# Patient Record
Sex: FEMALE | Race: WHITE | NOT HISPANIC OR LATINO | Employment: FULL TIME | URBAN - NONMETROPOLITAN AREA
[De-identification: names, ages, dates, MRNs, and addresses within clinical notes are randomized per-mention and may not be internally consistent; named-entity substitution may affect disease eponyms.]

---

## 2018-02-28 ENCOUNTER — OFFICE VISIT (OUTPATIENT)
Dept: INTERNAL MEDICINE | Facility: OTHER | Age: 53
End: 2018-02-28
Attending: INTERNAL MEDICINE
Payer: COMMERCIAL

## 2018-02-28 ENCOUNTER — TELEPHONE (OUTPATIENT)
Dept: INTERNAL MEDICINE | Facility: OTHER | Age: 53
End: 2018-02-28

## 2018-02-28 ENCOUNTER — HOSPITAL ENCOUNTER (EMERGENCY)
Facility: OTHER | Age: 53
Discharge: HOME | End: 2018-02-28
Attending: EMERGENCY MEDICINE | Admitting: EMERGENCY MEDICINE
Payer: COMMERCIAL

## 2018-02-28 ENCOUNTER — APPOINTMENT (OUTPATIENT)
Dept: CT IMAGING | Facility: OTHER | Age: 53
End: 2018-02-28
Attending: EMERGENCY MEDICINE
Payer: COMMERCIAL

## 2018-02-28 VITALS
HEART RATE: 70 BPM | TEMPERATURE: 98.2 F | SYSTOLIC BLOOD PRESSURE: 133 MMHG | OXYGEN SATURATION: 95 % | HEIGHT: 64 IN | WEIGHT: 140 LBS | DIASTOLIC BLOOD PRESSURE: 95 MMHG | RESPIRATION RATE: 18 BRPM | BODY MASS INDEX: 23.9 KG/M2

## 2018-02-28 VITALS
HEART RATE: 72 BPM | HEIGHT: 64 IN | BODY MASS INDEX: 25.37 KG/M2 | SYSTOLIC BLOOD PRESSURE: 124 MMHG | DIASTOLIC BLOOD PRESSURE: 94 MMHG | WEIGHT: 148.6 LBS

## 2018-02-28 DIAGNOSIS — S30.1XXD RECTUS SHEATH HEMATOMA, SUBSEQUENT ENCOUNTER: Primary | ICD-10-CM

## 2018-02-28 LAB
ANION GAP SERPL CALCULATED.3IONS-SCNC: 12 MMOL/L (ref 3–14)
BUN SERPL-MCNC: 15 MG/DL (ref 7–25)
CALCIUM SERPL-MCNC: 9.8 MG/DL (ref 8.6–10.3)
CHLORIDE SERPL-SCNC: 105 MMOL/L (ref 98–107)
CO2 SERPL-SCNC: 24 MMOL/L (ref 21–31)
CREAT SERPL-MCNC: 0.7 MG/DL (ref 0.6–1.2)
DIFFERENTIAL METHOD BLD: NORMAL
ERYTHROCYTE [DISTWIDTH] IN BLOOD BY AUTOMATED COUNT: 14 % (ref 10–15)
GFR SERPL CREATININE-BSD FRML MDRD: 87 ML/MIN/1.7M2
GLUCOSE SERPL-MCNC: 115 MG/DL (ref 70–105)
HCT VFR BLD AUTO: 42.1 % (ref 35–47)
HGB BLD-MCNC: 14.3 G/DL (ref 11.7–15.7)
INR PPP: 0.92 (ref 0–1.3)
MCH RBC QN AUTO: 30.6 PG (ref 26.5–33)
MCHC RBC AUTO-ENTMCNC: 34 G/DL (ref 31.5–36.5)
MCV RBC AUTO: 90 FL (ref 78–100)
PLATELET # BLD AUTO: 196 10E9/L (ref 150–450)
POTASSIUM SERPL-SCNC: 3.8 MMOL/L (ref 3.5–5.1)
RBC # BLD AUTO: 4.68 10E12/L (ref 3.8–5.2)
SODIUM SERPL-SCNC: 141 MMOL/L (ref 134–144)
WBC # BLD AUTO: 7.9 10E9/L (ref 4–11)

## 2018-02-28 PROCEDURE — 96376 TX/PRO/DX INJ SAME DRUG ADON: CPT | Performed by: EMERGENCY MEDICINE

## 2018-02-28 PROCEDURE — 85610 PROTHROMBIN TIME: CPT | Performed by: EMERGENCY MEDICINE

## 2018-02-28 PROCEDURE — 80048 BASIC METABOLIC PNL TOTAL CA: CPT | Performed by: EMERGENCY MEDICINE

## 2018-02-28 PROCEDURE — 99214 OFFICE O/P EST MOD 30 MIN: CPT | Performed by: INTERNAL MEDICINE

## 2018-02-28 PROCEDURE — 36415 COLL VENOUS BLD VENIPUNCTURE: CPT | Performed by: EMERGENCY MEDICINE

## 2018-02-28 PROCEDURE — 99284 EMERGENCY DEPT VISIT MOD MDM: CPT | Mod: Z6 | Performed by: EMERGENCY MEDICINE

## 2018-02-28 PROCEDURE — 99285 EMERGENCY DEPT VISIT HI MDM: CPT | Mod: 25 | Performed by: EMERGENCY MEDICINE

## 2018-02-28 PROCEDURE — 74177 CT ABD & PELVIS W/CONTRAST: CPT | Mod: TC

## 2018-02-28 PROCEDURE — 25000128 H RX IP 250 OP 636: Performed by: EMERGENCY MEDICINE

## 2018-02-28 PROCEDURE — 85025 COMPLETE CBC W/AUTO DIFF WBC: CPT | Performed by: EMERGENCY MEDICINE

## 2018-02-28 PROCEDURE — 96374 THER/PROPH/DIAG INJ IV PUSH: CPT | Performed by: EMERGENCY MEDICINE

## 2018-02-28 PROCEDURE — 25000125 ZZHC RX 250: Performed by: EMERGENCY MEDICINE

## 2018-02-28 RX ORDER — MORPHINE SULFATE 4 MG/ML
2 INJECTION, SOLUTION INTRAMUSCULAR; INTRAVENOUS ONCE
Status: COMPLETED | OUTPATIENT
Start: 2018-02-28 | End: 2018-02-28

## 2018-02-28 RX ORDER — HYDROCODONE BITARTRATE AND ACETAMINOPHEN 5; 325 MG/1; MG/1
1 TABLET ORAL EVERY 6 HOURS PRN
Qty: 12 TABLET | Refills: 0 | Status: SHIPPED | OUTPATIENT
Start: 2018-02-28 | End: 2021-02-23

## 2018-02-28 RX ORDER — MORPHINE SULFATE 4 MG/ML
4 INJECTION, SOLUTION INTRAMUSCULAR; INTRAVENOUS ONCE
Status: COMPLETED | OUTPATIENT
Start: 2018-02-28 | End: 2018-02-28

## 2018-02-28 RX ADMIN — MORPHINE SULFATE 4 MG: 10 INJECTION, SOLUTION INTRAMUSCULAR; INTRAVENOUS at 01:05

## 2018-02-28 RX ADMIN — IOHEXOL 100 ML: 350 INJECTION, SOLUTION INTRAVENOUS at 01:50

## 2018-02-28 RX ADMIN — MORPHINE SULFATE 2 MG: 4 INJECTION, SOLUTION INTRAMUSCULAR; INTRAVENOUS at 02:50

## 2018-02-28 RX ADMIN — MORPHINE SULFATE 4 MG: 10 INJECTION, SOLUTION INTRAMUSCULAR; INTRAVENOUS at 03:16

## 2018-02-28 ASSESSMENT — ENCOUNTER SYMPTOMS
FEVER: 0
CHILLS: 0
VOMITING: 0
ABDOMINAL PAIN: 1
NAUSEA: 0

## 2018-02-28 NOTE — PROGRESS NOTES
Chief Complaint: Emergency room visit follow-up.    HPI: This patient comes in today for a follow-up from the emergency room last evening.  I reviewed her note in its entirety and reviewed all of her studies as well.  She was having some coughing last evening that was fairly significant.  She woke in the early morning hours with severe right lower abdominal pain.  She went to the emergency room and testing ultimately revealed the fact that she has a rectus sheath hematoma.  This is probably from coughing vigorously.  Everything else on the CT was normal.  Her lab was unremarkable.  She has been taking some Aleve which may have obviously caused some thinning of her blood.  This was reviewed with her today.    The pain is still fairly intense.  She was given some pain pills when she was discharged from the emergency room.  They do help a little bit.  I spent some time today reconciling her medications, past medical history, past surgical history and social history.  She does smoke, encouraged her to discontinue that.  In the emergency room her blood pressure was extremely high, it is a little bit high today but not nearly as bad as it was in the emergency room.  No other complaints or concerns.    Past Medical History:   Diagnosis Date     History of other genital system and obstetric disorders      3, para 3       Past Surgical History:   Procedure Laterality Date     LAPAROSCOPIC TUBAL LIGATION      No Comments Provided       No Known Allergies    Current Outpatient Prescriptions   Medication Sig Dispense Refill     HYDROcodone-acetaminophen (NORCO) 5-325 MG per tablet Take 1 tablet by mouth every 6 hours as needed for moderate to severe pain 12 tablet 0       Social History     Social History     Marital status: Single     Spouse name: N/A     Number of children: N/A     Years of education: N/A     Occupational History     Not on file.     Social History Main Topics     Smoking status: Current Every Day  Smoker     Packs/day: 0.50     Smokeless tobacco: Never Used     Alcohol use 1.2 - 2.4 oz/week     2 - 4 Cans of beer per week      Comment: pt drinks once a week     Drug use: No     Sexual activity: Not on file     Other Topics Concern     Not on file     Social History Narrative    Lives in town.  .  Works at [employer].       Review of Systems    Physical Exam   Constitutional: She appears well-developed and well-nourished. She appears distressed.   Cardiovascular: Normal rate, regular rhythm and normal heart sounds.    Pulmonary/Chest: She has decreased breath sounds. She has no wheezes. She has rhonchi. She has no rales.   Abdominal: Soft. Normal appearance and bowel sounds are normal. She exhibits no shifting dullness, no distension and no abdominal bruit. There is no hepatosplenomegaly. There is tenderness. No hernia. Hernia confirmed negative in the right inguinal area and confirmed negative in the left inguinal area.       Skin: She is not diaphoretic.   Nursing note and vitals reviewed.      Assessment:      ICD-10-CM    1. Rectus sheath hematoma, subsequent encounter S30.1XXD        Plan: The diagnosis was reviewed with the patient and her  today.  I reviewed the CT scans with them as well.  At this point she has a spontaneous rectus sheath hematoma likely exacerbated by the Aleve that she was taking and the significant coughing spells that she was having.  I expect that this will resolve without incident with time.  No indication for surgical intervention per my impression as well as general surgery recommendation last evening.  Recommended rest over the next couple of days, she will be off work until Monday.  Follow-up if not improving or if symptoms worsen.  Otherwise, recommended that she schedule a visit with a provider at some time in the next few weeks for complete evaluation.

## 2018-02-28 NOTE — ED PROVIDER NOTES
History   No chief complaint on file.    HPI Comments: This is a 52-year-old who denies any significant past medical or surgical history than her tubes being tight presenting with complaint of worsening right lower quadrant abdominal pain which started 2 days ago as a pulled muscle sensation but has significantly gotten worse tonight.  She states she developed palpable very tender mass or lump in right lower abdominal area about several hours ago.  She denies nausea vomiting or diarrhea.  She said last bowel movement was just before she came here.  Denies history of hernia.  No vaginal or groin pain, no vaginal bleeding or discharge.  She feels the pain is radiating towards the right flank area.  However, she denies history of pyelonephritis or renal stones.  No history of inflammatory bowel disease or bowel obstruction.  Patient denies urinary symptoms.      Problem List:    There are no active problems to display for this patient.       Past Medical History:    Past Medical History:   Diagnosis Date     History of other genital system and obstetric disorders        Past Surgical History:    Past Surgical History:   Procedure Laterality Date     LAPAROSCOPIC TUBAL LIGATION      No Comments Provided       Family History:    No family history on file.    Social History:  Marital Status:  Unknown [6]  Social History   Substance Use Topics     Smoking status: Current Every Day Smoker     Packs/day: 0.50     Smokeless tobacco: Never Used     Alcohol use 1.2 - 2.4 oz/week     2 - 4 Cans of beer per week      Comment: pt drinks once a week        Medications:      No current outpatient prescriptions on file.      Review of Systems   Constitutional: Negative for chills and fever.   Gastrointestinal: Positive for abdominal pain. Negative for nausea and vomiting.        Palpable mass right lower quadrant abdominal area   All other systems reviewed and are negative.      Physical Exam   BP: (!) 223/125  Pulse: 70  Temp:  "98.2  F (36.8  C)  Resp: 18  Height: 162.6 cm (5' 4\")  Weight: 63.5 kg (140 lb)  SpO2: 97 %      Physical Exam   Constitutional: She appears well-developed and well-nourished. No distress.   Cardiovascular: Normal rate, regular rhythm, normal heart sounds and intact distal pulses.    Pulmonary/Chest: Effort normal and breath sounds normal. No respiratory distress. She has no wheezes. She has no rales. She exhibits no tenderness.   Abdominal: Soft. Bowel sounds are normal. She exhibits no distension and no mass. There is no rebound.   There is soft palpable mass across right lower quadrant abdominal area which is quite tender on palpation.  The exam is otherwise unremarkable       ED Course     Results for orders placed or performed during the hospital encounter of 02/28/18   CT Abdomen Pelvis w Contrast    Narrative    EXAM:  CT Abdomen and Pelvis With Intravenous Contrast    CLINICAL HISTORY:  52 years old, female; Pain; Abdominal pain; Right lower   quadrant; Additional info: Rlq abd pain with palpable soft mass    TECHNIQUE:  Axial computed tomography images of the abdomen and pelvis with   intravenous contrast.  All CT scans at this facility use one or more dose   reduction techniques, viz.: automated exposure control; ma/kV adjustment per   patient size (including targeted exams where dose is matched to indication;   i.e. head); or iterative reconstruction technique.  Coronal and sagittal   reformatted images were created and reviewed.    CONTRAST:  100 mL of onipaque 350 administered intravenously.    COMPARISON:  No relevant prior studies available.    FINDINGS:    Lower thorax: No acute findings.     ABDOMEN:    Liver:  Unremarkable.  No mass.    Gallbladder and bile ducts:  Unremarkable.  No calcified stones.  No ductal   dilation.    Pancreas:  Unremarkable.  No mass.  No ductal dilation.    Spleen:  Unremarkable.  No splenomegaly.    Adrenals:  Unremarkable.  No mass.    Kidneys and ureters:  " Unremarkable.  No solid mass.  No hydronephrosis.    Stomach and bowel:  Unremarkable.  No obstruction.  No mucosal thickening.    Appendix:  Normal appendix.     PELVIS:    Bladder:  Unremarkable.  No mass.    Reproductive:  Calcified and noncalcified fibroids.     ABDOMEN and PELVIS:    Intraperitoneal space:  Unremarkable.  No free air.  No significant fluid   collection.    Bones/joints:  No acute fracture.  No dislocation.    Soft tissues:  4.1 x 5.7 cm rectus sheath hematoma.    Vasculature:  Unremarkable.  No abdominal aortic aneurysm.    Lymph nodes:  Unremarkable.  No enlarged lymph nodes.      Impression    IMPRESSION:       1.  Normal appendix.  2.  4.1 x 5.7 cm rectus sheath hematoma.    THIS DOCUMENT HAS BEEN ELECTRONICALLY SIGNED BY TARA DAMON MD   CBC with platelets differential   Result Value Ref Range    WBC 7.9 4.0 - 11.0 10e9/L    RBC Count 4.68 3.8 - 5.2 10e12/L    Hemoglobin 14.3 11.7 - 15.7 g/dL    Hematocrit 42.1 35.0 - 47.0 %    MCV 90 78 - 100 fl    MCH 30.6 26.5 - 33.0 pg    MCHC 34.0 31.5 - 36.5 g/dL    RDW 14.0 10.0 - 15.0 %    Platelet Count 196 150 - 450 10e9/L    Diff Method Automated Method    Basic metabolic panel   Result Value Ref Range    Sodium 141 134 - 144 mmol/L    Potassium 3.8 3.5 - 5.1 mmol/L    Chloride 105 98 - 107 mmol/L    Carbon Dioxide 24 21 - 31 mmol/L    Anion Gap 12 3 - 14 mmol/L    Glucose 115 (H) 70 - 105 mg/dL    Urea Nitrogen 15 7 - 25 mg/dL    Creatinine 0.70 0.60 - 1.20 mg/dL    GFR Estimate 87 >60 mL/min/1.7m2    GFR Estimate If Black >90 >60 mL/min/1.7m2    Calcium 9.8 8.6 - 10.3 mg/dL   INR   Result Value Ref Range    INR 0.92 0 - 1.3       Patient presents with acute right lower quadrant abdominal pain with trauma with a palpable very tender mass in that area.  This all started earlier tonight.  However, patient also mentions in the past several days she had a sensation of muscle pull in that area but the pain has significantly gotten worse tonight.   Multiple differentials for this large including acute appendicitis, hernia, bowel obstruction, ovarian torsion, mass lesion, stool pain or right-sided renal stone.  Pain well-controlled with morphine 4 mg IV. The fact that there is subcutaneous very tender palpable mass makes ovarian torsion very unlikely. CBC and basic metabolic lab results are unremarkable.  CT abdomen/pelvis with IV contrast reveals 4 x 6 cm rectus abdominis sheath hematoma.  This is consistent with patient exam findings.  Dr. Austin consulted and he states that there is no need for surgical intervention at this time, pain control and close follow-up.  Prescription for Norco 5/325 given dispensing 12 tablets and patient advised that to follow with her primary care physician for further checkup this afternoon or tomorrow.  Both patient and her  understand that patient could be having ongoing bleeding with expanding hematoma.  Therefore they are advised that should she develop increasing pain or mass sensation or dizziness/lightheadedness or shortness of breath she should return to ER.    ED Course     Procedures               Critical Care time:  none               Labs Ordered and Resulted from Time of ED Arrival Up to the Time of Departure from the ED   BASIC METABOLIC PANEL - Abnormal; Notable for the following:        Result Value    Glucose 115 (*)     All other components within normal limits   CBC WITH PLATELETS DIFFERENTIAL   INR       Assessments & Plan (with Medical Decision Making)     I have reviewed the nursing notes.    I have reviewed the findings, diagnosis, plan and need for follow up with the patient.       New Prescriptions    No medications on file       Final diagnoses:   None       2/28/2018   [facility]     Willis Lujan MD  02/28/18 [id number]

## 2018-02-28 NOTE — LETTER
[facility]  [address]  [facility] [address]  Phone: [phone]  Fax: [phone]    February 28, 2018        Camille Mena  [address] [facility] [address]          To whom it may concern:    RE: Camille Mena    Patient was seen and treated today at our emergency room early this morning and should be excused for wor      Sincerely,        Willis Lujan MD

## 2018-02-28 NOTE — LETTER
February 28, 2018        Camille Mena  [address] [facility] [address]        To whom it may concern:    Please be advised that this patient has a medical condition that prevents her from returning to work.  She will need to be off of work completely until March 5, 2018.    Sincerely,        Stan Waggoner MD  Internal Medicine  [facility] and [facility]

## 2018-02-28 NOTE — ED AVS SNAPSHOT
[facility]    [address] [facility] [address]    Phone:  [phone]    Fax:  [phone]                                       Camille Mena   MRN: [id number]    Department:  [facility]   Date of Visit:  2/28/2018           Patient Information     Date Of Birth          1965        Your diagnoses for this visit were:     None       You were seen by Willis Lujan MD.      24 Hour Appointment Hotline       To make an appointment at any [facility], call [phone] ([phone]). If you don't have a family doctor or clinic, we will help you find one. [facility] clinics are conveniently located to serve the needs of you and your family.             Review of your medicines      START taking        Dose / Directions Last dose taken    HYDROcodone-acetaminophen 5-325 MG per tablet   Commonly known as:  NORCO   Dose:  1 tablet   Quantity:  12 tablet        Take 1 tablet by mouth every 6 hours as needed for moderate to severe pain   Refills:  0                Prescriptions were sent or printed at these locations (1 Prescription)                   [facility] [address] [address]    Telephone:  [phone]   Fax:  [phone]   Hours:                  Printed at Department/Unit printer (1 of 1)         HYDROcodone-acetaminophen (NORCO) 5-325 MG per tablet                Procedures and tests performed during your visit     Basic metabolic panel    CBC with platelets differential    CT Abdomen Pelvis w Contrast    INR      Orders Needing Specimen Collection     None      Pending Results     No orders found from 2/26/2018 to 3/1/2018.            Pending Culture Results     No orders found from 2/26/2018 to 3/1/2018.            Thank you for choosing [facility]       Thank you for choosing [facility] for your care. Our goal is always to provide you with excellent  "care. Hearing back from our patients is one way we can continue to improve our services. Please take a few minutes to complete the written survey that you may receive in the mail after you visit with us. Thank you!        [employer] Information     [employer] lets you send messages to your doctor, view your test results, renew your prescriptions, schedule appointments and more. To sign up, go to [email or web address] . Click on \"Log in\" on the left side of the screen, which will take you to the Welcome page. Then click on \"Sign up Now\" on the right side of the page.     You will be asked to enter the access code listed below, as well as some personal information. Please follow the directions to create your username and password.     Your access code is: [email or web address]  Expires: 2018  3:32 AM     Your access code will  in 90 days. If you need help or a new code, please call your [facility] clinic or [phone].        Care EveryWhere ID     This is your Care EveryWhere ID. This could be used by other organizations to access your [facility] medical records  [id number]        Equal Access to Services     [facility] Center[facility] : Hadii yulissa Garcia, ashley lopez, qabravo kasae tate, mykel aburto . So [facility] [phone].    ATENCIÓN: Si habla español, tiene a rutherford disposición servicios gratuitos de asistencia lingüística. Llame al [phone].    We comply with applicable federal civil rights laws and [place] laws. We do not discriminate on the basis of race, color, national origin, age, disability, sex, sexual orientation, or gender identity.            After Visit Summary       This is your record. Keep this with you and show to your community pharmacist(s) and doctor(s) at your next visit.                  "

## 2018-02-28 NOTE — ED NOTES
"Pt states that she has been having abdominal pain since tonight, the pain woke her up.  Pt states that she has had a cold for approx. A week and when she woke up coughing she felt something \"pop\" in her abdomen.   Pt has had some pain in the RLQ for a few days, but not as bad as tonight.  Pt states that she feels a bump on her RLQ.   "

## 2018-02-28 NOTE — MR AVS SNAPSHOT
"              After Visit Summary   2018    Camille Mena    MRN: [id number]           Patient Information     Date Of Birth          1965        Visit Information        Provider Department      2018 2:40 PM Stan Waggoner MD [facility]        Today's Diagnoses     Rectus sheath hematoma, subsequent encounter    -  1       Follow-ups after your visit        Who to contact     If you have questions or need follow up information about today's clinic visit or your schedule please contact [facility] directly at [phone].  Normal or non-critical lab and imaging results will be communicated to you by [employer], letter or phone within 4 business days after the clinic has received the results. If you do not hear from us within 7 days, please contact the clinic through [employer] or phone. If you have a critical or abnormal lab result, we will notify you by phone as soon as possible.  Submit refill requests through [employer] or call your pharmacy and they will forward the refill request to us. Please allow 3 business days for your refill to be completed.          Additional Information About Your Visit        MyChart Information     [employer] lets you send messages to your doctor, view your test results, renew your prescriptions, schedule appointments and more. To sign up, go to [email or web address] . Click on \"Log in\" on the left side of the screen, which will take you to the Welcome page. Then click on \"Sign up Now\" on the right side of the page.     You will be asked to enter the access code listed below, as well as some personal information. Please follow the directions to create your username and password.     Your access code is: [email or web address]  Expires: 2018  3:32 AM     Your access code will  in 90 days. If you need help or a new code, please call your [facility] clinic or [phone].        Care EveryWhere ID     This is your Care EveryWhere " "ID. This could be used by other organizations to access your [facility] medical records  [id number]        Your Vitals Were     Pulse Height BMI (Body Mass Index)             72 5' 4\" (1.626 m) 25.51 kg/m2          Blood Pressure from Last 3 Encounters:   02/28/18 (!) 124/94   02/28/18 (!) 133/95    Weight from Last 3 Encounters:   02/28/18 148 lb 9.6 oz (67.4 kg)   02/28/18 140 lb (63.5 kg)              Today, you had the following     No orders found for display       Primary Care Provider Fax #    Physician No Ref-Primary [phone]       No address on file        Equal Access to Services     [facility] AMAYA : Burt Garcia, ashley lopez, re tate, mykel aburto . So [facility] [phone].    ATENCIÓN: Si habla español, tiene a rutherford disposición servicios gratuitos de asistencia lingüística. Llame al [phone].    We comply with applicable federal civil rights laws and [place] laws. We do not discriminate on the basis of race, color, national origin, age, disability, sex, sexual orientation, or gender identity.            Thank you!     Thank you for choosing [facility] AND [facility]  for your care. Our goal is always to provide you with excellent care. Hearing back from our patients is one way we can continue to improve our services. Please take a few minutes to complete the written survey that you may receive in the mail after your visit with us. Thank you!             Your Updated Medication List - Protect others around you: Learn how to safely use, store and throw away your medicines at www.disposemymeds.org.          This list is accurate as of 2/28/18  3:17 PM.  Always use your most recent med list.                   Brand Name Dispense Instructions for use Diagnosis    HYDROcodone-acetaminophen 5-325 MG per tablet    NORCO    12 tablet    Take 1 tablet by mouth every 6 hours as needed for moderate to severe pain          "

## 2018-02-28 NOTE — ED AVS SNAPSHOT
[facility]    [address]    [place] [address]    Phone:  [phone]    Fax:  [phone]                                       Camille Mena   MRN: [id number]    Department:  [facility]   Date of Visit:  2/28/2018           After Visit Summary Signature Page     I have received my discharge instructions, and my questions have been answered. I have discussed any challenges I see with this plan with the nurse or doctor.    ..........................................................................................................................................  Patient/Patient Representative Signature      ..........................................................................................................................................  Patient Representative Print Name and Relationship to Patient    ..................................................               ................................................  Date                                            Time    ..........................................................................................................................................  Reviewed by Signature/Title    ...................................................              ..............................................  Date                                                            Time

## 2018-02-28 NOTE — TELEPHONE ENCOUNTER
Patient was called but unable to leave message as phone number is incorrect. Call being placed to do a follow-up. Ondina Mark LPN....................  2/28/2018   3:34 PM

## 2018-03-01 ASSESSMENT — PATIENT HEALTH QUESTIONNAIRE - PHQ9: SUM OF ALL RESPONSES TO PHQ QUESTIONS 1-9: 0

## 2021-02-20 ENCOUNTER — APPOINTMENT (OUTPATIENT)
Dept: GENERAL RADIOLOGY | Facility: OTHER | Age: 56
End: 2021-02-20
Attending: STUDENT IN AN ORGANIZED HEALTH CARE EDUCATION/TRAINING PROGRAM
Payer: OTHER MISCELLANEOUS

## 2021-02-20 ENCOUNTER — HOSPITAL ENCOUNTER (EMERGENCY)
Facility: OTHER | Age: 56
Discharge: HOME | End: 2021-02-20
Attending: STUDENT IN AN ORGANIZED HEALTH CARE EDUCATION/TRAINING PROGRAM | Admitting: STUDENT IN AN ORGANIZED HEALTH CARE EDUCATION/TRAINING PROGRAM
Payer: OTHER MISCELLANEOUS

## 2021-02-20 VITALS
HEART RATE: 86 BPM | SYSTOLIC BLOOD PRESSURE: 221 MMHG | DIASTOLIC BLOOD PRESSURE: 123 MMHG | WEIGHT: 145 LBS | TEMPERATURE: 98 F | BODY MASS INDEX: 24.89 KG/M2 | OXYGEN SATURATION: 97 % | RESPIRATION RATE: 18 BRPM

## 2021-02-20 DIAGNOSIS — R03.0 ELEVATED BLOOD PRESSURE READING WITHOUT DIAGNOSIS OF HYPERTENSION: ICD-10-CM

## 2021-02-20 DIAGNOSIS — S89.92XA KNEE INJURY, LEFT, INITIAL ENCOUNTER: ICD-10-CM

## 2021-02-20 LAB
ANION GAP SERPL CALCULATED.3IONS-SCNC: 8 MMOL/L (ref 3–14)
BUN SERPL-MCNC: 11 MG/DL (ref 7–25)
CALCIUM SERPL-MCNC: 9.4 MG/DL (ref 8.6–10.3)
CHLORIDE SERPL-SCNC: 105 MMOL/L (ref 98–107)
CO2 SERPL-SCNC: 24 MMOL/L (ref 21–31)
CREAT SERPL-MCNC: 0.84 MG/DL (ref 0.6–1.2)
GFR SERPL CREATININE-BSD FRML MDRD: 70 ML/MIN/{1.73_M2}
GLUCOSE SERPL-MCNC: 106 MG/DL (ref 70–105)
POTASSIUM SERPL-SCNC: 3.8 MMOL/L (ref 3.5–5.1)
SODIUM SERPL-SCNC: 137 MMOL/L (ref 134–144)

## 2021-02-20 PROCEDURE — 80048 BASIC METABOLIC PNL TOTAL CA: CPT | Performed by: STUDENT IN AN ORGANIZED HEALTH CARE EDUCATION/TRAINING PROGRAM

## 2021-02-20 PROCEDURE — 99285 EMERGENCY DEPT VISIT HI MDM: CPT | Mod: 25 | Performed by: STUDENT IN AN ORGANIZED HEALTH CARE EDUCATION/TRAINING PROGRAM

## 2021-02-20 PROCEDURE — 250N000013 HC RX MED GY IP 250 OP 250 PS 637: Performed by: STUDENT IN AN ORGANIZED HEALTH CARE EDUCATION/TRAINING PROGRAM

## 2021-02-20 PROCEDURE — 36415 COLL VENOUS BLD VENIPUNCTURE: CPT | Performed by: STUDENT IN AN ORGANIZED HEALTH CARE EDUCATION/TRAINING PROGRAM

## 2021-02-20 PROCEDURE — 93010 ELECTROCARDIOGRAM REPORT: CPT | Performed by: INTERNAL MEDICINE

## 2021-02-20 PROCEDURE — 99283 EMERGENCY DEPT VISIT LOW MDM: CPT | Performed by: STUDENT IN AN ORGANIZED HEALTH CARE EDUCATION/TRAINING PROGRAM

## 2021-02-20 PROCEDURE — 73562 X-RAY EXAM OF KNEE 3: CPT | Mod: LT

## 2021-02-20 PROCEDURE — 93005 ELECTROCARDIOGRAM TRACING: CPT | Performed by: STUDENT IN AN ORGANIZED HEALTH CARE EDUCATION/TRAINING PROGRAM

## 2021-02-20 RX ORDER — LISINOPRIL 10 MG/1
10 TABLET ORAL DAILY
Qty: 30 TABLET | Refills: 0 | Status: SHIPPED | OUTPATIENT
Start: 2021-02-20 | End: 2021-02-23

## 2021-02-20 RX ORDER — ACETAMINOPHEN 325 MG/1
975 TABLET ORAL ONCE
Status: COMPLETED | OUTPATIENT
Start: 2021-02-20 | End: 2021-02-20

## 2021-02-20 RX ORDER — IBUPROFEN 400 MG/1
400 TABLET, FILM COATED ORAL ONCE
Status: COMPLETED | OUTPATIENT
Start: 2021-02-20 | End: 2021-02-20

## 2021-02-20 RX ADMIN — IBUPROFEN 400 MG: 200 TABLET, FILM COATED ORAL at 08:08

## 2021-02-20 RX ADMIN — ACETAMINOPHEN 975 MG: 325 TABLET, FILM COATED ORAL at 08:08

## 2021-02-20 NOTE — LETTER
[facility] AND [facility]  [address]  [place] [address]  [phone]      February 20, 2021    RE:  Camille Mena                                                                                                                                                       [address] [facility] [address]          To whom it may concern:    Camille Mena was evaluated in the emergency department today for a medical concern. Please excuse from work until Thursday, February 25th.    Sincerely,  Christopher Maldonado MD

## 2021-02-20 NOTE — ED NOTES
Pt hypertensive, reported to MD     02/20/21 0810   Vital Signs   BP (!) 222/141   BP - Mean 167   Pulse 96

## 2021-02-20 NOTE — ED PROVIDER NOTES
History     Chief Complaint   Patient presents with     Knee Pain     HPI  Camille Mena is a 55 year old female with no significant medical history who presents with left knee pain. Patient reports she was at work yesterday and bumped her left knee against a metal counter; she felt something pop at that time when she jerked away from the counter but no significant pain, was able to continue her work day uninterrupted. She then woke up this morning at 3:30 AM with increased pain and swelling in her knee; she was able to go back to bed thinking it would get better this morning but on waking this morning the pain and swelling was significantly worse and patient had significant difficulty walking due to the pain. She denies denies any redness around the knee, no other trauma. No fevers/chills, nausea/vomiting, otherwise feeling well. No history of gout or similar swelling in other joints in the past. She is able to bend the knee but it is quite uncomfortable. She has not yet taken any medication for the pain.       Allergies:  No Known Allergies    Problem List:    There are no active problems to display for this patient.       Past Medical History:    Past Medical History:   Diagnosis Date     History of other genital system and obstetric disorders        Past Surgical History:    Past Surgical History:   Procedure Laterality Date     LAPAROSCOPIC TUBAL LIGATION      No Comments Provided       Family History:    No family history on file.    Social History:  Marital Status:  Single [1]  Social History     Tobacco Use     Smoking status: Current Every Day Smoker     Packs/day: 0.50     Smokeless tobacco: Never Used   Substance Use Topics     Alcohol use: Yes     Alcohol/week: 2.0 - 4.0 standard drinks     Types: 2 - 4 Cans of beer per week     Comment: pt drinks once a week     Drug use: No        Medications:    lisinopril (ZESTRIL) 10 MG tablet  HYDROcodone-acetaminophen (NORCO) 5-325 MG per  tablet          Review of Systems  10-point ROS complete and negative other than as noted in HPI above.    Physical Exam   BP: (!) 226/138  Pulse: 92  Temp: 98  F (36.7  C)  Resp: 18  Weight: 65.8 kg (145 lb)  SpO2: 97 %      Physical Exam  Gen: Lying in bed, no acute distress but uncomfortable, alert  HEENT: NC/AT, MMM  CV: RRR, appears warm and well-perfused  Pulm: CTAB, normal respiratory effort  Abd: Soft, NT, ND  Skin: no rash or other lesions  MSK: Left knee swelling and warmth compared to contralateral side; active ROM intact including knee flexion/extension but with discomfort. No other gross deformities or swelling. Mild TTP about knee joint, no gross laxity on limited exam due to swelling  Neuro: A&O x4, no focal deficits, CN II-XII grossly intact, distal CMS intact LLE    ED Course     ED Course as of Feb 21 0942   Sat Feb 20, 2021   0741 Patient evaluated, plan for ice, tylenol/ibuprofen, x-ray, re-assess. Injury consistent with likely traumatic mechanism, low suspicion for gout or septic joint, suspect hemarthrosis from likely ligamentous injury      0817 Repeat BP still quite high, will obtain EKG and BMP      0824 EKG reviewed, no STEMI or current of injury, no LVH, normal QRS and QTc, normal sinus rhythm. No significant Q waves to suggest prior infarct      0830 Patient re-evaluated, discussed high BP and likely starting anti-hypertensive medication before discharge; will try to ambulate, if struggling will provide crutches, also ACE wrap, discharge with close outpatient follow-up and work letter      0841 X-ray reviewed, no fracture on my read      0855 Patient with significant pain with ambulation, crutch training provided      0909 BMP unremarkable, creatinine within normal limits, plan for discharge        Procedures               Critical Care time:  none               No results found for this or any previous visit (from the past 24 hour(s)).    Medications   acetaminophen (TYLENOL) tablet 975  mg (975 mg Oral Given 2/20/21 0808)   ibuprofen (ADVIL/MOTRIN) tablet 400 mg (400 mg Oral Given 2/20/21 0808)       Assessments & Plan (with Medical Decision Making)   55-year-old woman with no significant medical history who presents with left knee pain and swelling after injury to left knee 1 day ago. Vitally stable, afebrile. Examined as above. DDx includes traumatic hemarthrosis, ligamentous injury, gout, septic joint. Low suspicion for septic joint as patient has intact active ROM and no systemic symptoms, similarly no history of gout and history consistent with traumatic injury. X-ray with no fracture, confirmed moderate to large effusion. Patient given tylenol and ibuprofen, pain improved on recheck. Patient also noted to be very hypertensive here; EKG without ischemic changes or appreciable Q waves or evidence of LVH, and BMP with normal creatinine. Given degree of elevated BP will start patient on lisinopril 10 mg and arrange for close outpatient follow up. Patient provided crutch training, appropriate for discharge with close outpatient follow-up, careful return precautions provided.    From ED discharge instructions:  You have evidence of left knee swelling likely from bleeding into the knee joint; this is commonly caused by a ligament injury. Typically the pain and swelling will peak in 1-2 days from the injury and then slowly improve with time.    Take tylenol up to 1000 mg three times daily, and ibuprofen 400 mg every 6 hours as needed (only take ibuprofen 30 minutes after eating something) for pain.    Ice the affected area 4 times daily.    Elevate the affected limb while lying down and resting to reduce swelling.    Use the provided ACE wrap to compress the knee and take tension off the skin for comfort.    Use crutches as instructed for mobility. Avoid going outside when icy.    Drink plenty of fluids and get lots of rest. Avoid any further injury.    See attached instructions for further self-care  information.    Your blood pressure was found to be very high today. This can cause problems including stroke or heart attacks if left untreated over time. A medication called lisinopril was prescribed and should be started immediately; take once daily as prescribed and follow up closely in clinic to discuss further.    Follow up with a doctor in clinic at [facility] within the next 2-5 days for a re-check. At that time you can discuss additional options including physical therapy or MRI.    Come back to the emergency department immediately or call 911 if new fever greater than 101 degrees Fahrenheit, vomiting and inability to eat or drink, extensive rash or redness develops over the knee, throat swelling or difficulty breathing, or any other concerns.      I have reviewed the nursing notes.    I have reviewed the findings, diagnosis, plan and need for follow up with the patient.       Discharge Medication List as of 2/20/2021  9:13 AM      START taking these medications    Details   lisinopril (ZESTRIL) 10 MG tablet Take 1 tablet (10 mg) by mouth daily, Disp-30 tablet, R-0, E-Prescribe             Final diagnoses:   Knee injury, left, initial encounter   Elevated blood pressure reading without diagnosis of hypertension       2/20/2021   [facility]     Christopher Gomez MD  02/21/21 8396

## 2021-02-20 NOTE — DISCHARGE INSTRUCTIONS
You have evidence of left knee swelling likely from bleeding into the knee joint; this is commonly caused by a ligament injury. Typically the pain and swelling will peak in 1-2 days from the injury and then slowly improve with time.    Take tylenol up to 1000 mg three times daily, and ibuprofen 400 mg every 6 hours as needed (only take ibuprofen 30 minutes after eating something) for pain.    Ice the affected area 4 times daily.    Elevate the affected limb while lying down and resting to reduce swelling.    Use the provided ACE wrap to compress the knee and take tension off the skin for comfort.    Use crutches as instructed for mobility. Avoid going outside when icy.    Drink plenty of fluids and get lots of rest. Avoid any further injury.    See attached instructions for further self-care information.    Your blood pressure was found to be very high today. This can cause problems including stroke or heart attacks if left untreated over time. A medication called lisinopril was prescribed and should be started immediately; take once daily as prescribed and follow up closely in clinic to discuss further.    Follow up with a doctor in clinic at [facility] within the next 2-5 days for a re-check. At that time you can discuss additional options including physical therapy or MRI.    Come back to the emergency department immediately or call 911 if new fever greater than 101 degrees Fahrenheit, vomiting and inability to eat or drink, extensive rash or redness develops over the knee, throat swelling or difficulty breathing, or any other concerns.

## 2021-02-20 NOTE — ED NOTES
Pt was unable to take a step when standing. Pt fitted and intructed on crutches. Pt verbalized understanding. Pt was able to ambulate with crutches and stated it helped a lot. Reported to provider.

## 2021-02-20 NOTE — ED TRIAGE NOTES
Pt presents with left knee pain and swelling. Pt was at work yesterday and bumped her left knee on metal counter, pt stated it was not that hard. Pt continued her shift and was fine but this morning at 0330 pt woke up with pain and swelling rated at 8/10, at rest pt states there is no pain. Pt states now when she tries to bear wt on her knee she hears feels popping. G[employer]-15

## 2021-02-23 ENCOUNTER — OFFICE VISIT (OUTPATIENT)
Dept: FAMILY MEDICINE | Facility: OTHER | Age: 56
End: 2021-02-23
Attending: CHIROPRACTOR
Payer: OTHER MISCELLANEOUS

## 2021-02-23 ENCOUNTER — OFFICE VISIT (OUTPATIENT)
Dept: FAMILY MEDICINE | Facility: OTHER | Age: 56
End: 2021-02-23
Attending: NURSE PRACTITIONER
Payer: COMMERCIAL

## 2021-02-23 VITALS
DIASTOLIC BLOOD PRESSURE: 124 MMHG | HEART RATE: 79 BPM | RESPIRATION RATE: 20 BRPM | TEMPERATURE: 97.7 F | SYSTOLIC BLOOD PRESSURE: 198 MMHG | OXYGEN SATURATION: 97 %

## 2021-02-23 VITALS
TEMPERATURE: 97.7 F | RESPIRATION RATE: 20 BRPM | SYSTOLIC BLOOD PRESSURE: 202 MMHG | DIASTOLIC BLOOD PRESSURE: 130 MMHG | OXYGEN SATURATION: 99 % | HEART RATE: 81 BPM
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
## 2021-02-23 DIAGNOSIS — S89.92XA KNEE INJURY, LEFT, INITIAL ENCOUNTER: ICD-10-CM

## 2021-02-23 DIAGNOSIS — I10 BENIGN ESSENTIAL HYPERTENSION: Primary | ICD-10-CM

## 2021-02-23 LAB — INTERPRETATION ECG - MUSE: NORMAL

## 2021-02-23 PROCEDURE — 99213 OFFICE O/P EST LOW 20 MIN: CPT | Performed by: CHIROPRACTOR

## 2021-02-23 PROCEDURE — 99203 OFFICE O/P NEW LOW 30 MIN: CPT | Performed by: NURSE PRACTITIONER

## 2021-02-23 RX ORDER — LISINOPRIL AND HYDROCHLOROTHIAZIDE 12.5; 2 MG/1; MG/1
1 TABLET ORAL DAILY
Qty: 30 TABLET | Refills: 1 | Status: SHIPPED | OUTPATIENT
Start: 2021-02-23 | End: 2021-03-23

## 2021-02-23 SDOH — HEALTH STABILITY: MENTAL HEALTH: HOW OFTEN DO YOU HAVE A DRINK CONTAINING ALCOHOL?: 2-4 TIMES A MONTH

## 2021-02-23 SDOH — HEALTH STABILITY: MENTAL HEALTH: HOW OFTEN DO YOU HAVE 6 OR MORE DRINKS ON ONE OCCASION?: NOT ASKED

## 2021-02-23 SDOH — HEALTH STABILITY: MENTAL HEALTH: HOW MANY STANDARD DRINKS CONTAINING ALCOHOL DO YOU HAVE ON A TYPICAL DAY?: NOT ASKED

## 2021-02-23 ASSESSMENT — PAIN SCALES - GENERAL
PAINLEVEL: NO PAIN (0)
PAINLEVEL: NO PAIN (0)

## 2021-02-23 NOTE — PROGRESS NOTES
"  CHIEF COMPLAINT: Camille Mena is a 55 year old female presenting for left knee injury at the workplace.  Chief Complaint   Patient presents with     Work Comp     left knee       HISTORY OF PRESENTING WORK INJURY     Date of injury is 2021 at approximately 2:30 pm.  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  She reports walking over to assist bagging groceries, and she bumped her left knee on the counter.  She continued to work the remainder of her shift which ended at around 5:00 pm.  During this time, she reports feeling \"clicking\" at the medial knee that was not present prior to the injury.  That night at around 3:30 am, she woke up with severe pain in the knee and moderate swelling.  She went to the emergency room the following date with complaints of moderate swelling and pain/difficulty with standing and walking.  X-rays were obtained which showed joint effusion.  There were no bony abnormalities.      She saw TRUE Murphy this morning for hypertension only.  She will continue to manage this with Leigh Gibbons.  Her knee remains moderately swollen and she is unable to bear weight without pain.  She is here with her  and is in a wheelchair.      PAST MEDICAL HISTORY:  Past Medical History:   Diagnosis Date     History of other genital system and obstetric disorders      3, para 3   Right knee injury, non-surgical and fully resolved \"a few years ago\"      PAST SURGICAL HISTORY:  Past Surgical History:   Procedure Laterality Date     LAPAROSCOPIC TUBAL LIGATION      No Comments Provided       ALLERGIES:  No Known Allergies    CURRENT MEDICATIONS:  Current Outpatient Medications   Medication Sig Dispense Refill     lisinopril-hydrochlorothiazide (ZESTORETIC) 20-12.5 MG tablet Take 1 tablet by mouth daily 30 tablet 1       SOCIAL HISTORY:  Social History     Socioeconomic History     Marital status: Single     Spouse name: Not on file     Number of " children: Not on file     Years of education: Not on file     Highest education level: Not on file   Occupational History     Not on file   Social Needs     Financial resource strain: Not on file     Food insecurity     Worry: Not on file     Inability: Not on file     Transportation needs     Medical: Not on file     Non-medical: Not on file   Tobacco Use     Smoking status: Current Every Day Smoker     Packs/day: 0.50     Smokeless tobacco: Never Used   Substance and Sexual Activity     Alcohol use: Yes     Alcohol/week: 2.0 - 4.0 standard drinks     Types: 2 - 4 Cans of beer per week     Frequency: 2-4 times a month     Comment: pt drinks once a week     Drug use: No     Sexual activity: Yes     Partners: Male   Lifestyle     Physical activity     Days per week: Not on file     Minutes per session: Not on file     Stress: Not on file   Relationships     Social connections     Talks on phone: Not on file     Gets together: Not on file     Attends Jewish service: Not on file     Active member of club or organization: Not on file     Attends meetings of clubs or organizations: Not on file     Relationship status: Not on file     Intimate partner violence     Fear of current or ex partner: Not on file     Emotionally abused: Not on file     Physically abused: Not on file     Forced sexual activity: Not on file   Other Topics Concern     Not on file   Social History Narrative    Lives in town.  .  Works at [employer].       FAMILY HISTORY:  History reviewed. No pertinent family history.    REVIEW OF SYSTEMS:    Nursing Notes:   Dipti Vital LPN  2/23/2021 11:07 AM  Signed  Camille Mena is a 55 year old female presenting for an initial work comp evaluation for the following injuries:  DATE OF INJURY: 2/19/21  Employer: [employer]  Medication Reconciliation: complete    Review Of Systems  Skin: negative  Eyes: negative  Ears/Nose/Throat: negative  Respiratory: No shortness of breath, dyspnea on  exertion, cough, or hemoptysis  Cardiovascular: negative  Gastrointestinal: negative  Genitourinary: negative  Musculoskeletal: positive for injury to knee  Neurologic: negative  Psychiatric: negative  Hematologic/Lymphatic/Immunologic: negative  Endocrine: negative     Dipti Vital LPN  2/23/2021 10:54 AM     Reviewed: STEPHANIE          PHYSICAL EXAM:   BP (!) 198/124   Pulse 79   Temp 97.7  F (36.5  C) (Tympanic)   Resp 20   SpO2 97%   Breastfeeding No  There is no height or weight on file to calculate BMI.    General Appearance: Alert, appropriate appearance for age. Painful and teary. Patient requires assistance to get out of her wheelchair and move to my examination table.    Left Knee:    Palpation:    Effusion: moderate, suprapatellar   Pain Palpated: medially and infrapatellar  Skin:   No abrasions, slight bruising  Range of Motion Testing:   Flexion: 23 degrees (0-135)   Extension: 15 (0    Sensory is: Intact     I was unable to perform any further orthopedic testing due to pain and limited motion.         IMPRESSION/PLAN:    I placed an MRI order for the knee as well as a referral for orthopedic evaluation.  I suspect cartilaginous involvement. Instructions for resting, icing, and elevation were given.  Continue with compression as instructed by ED.  Issued time off work restrictions.  She does qualify for light duty sitting duties, but indicate to me that this would involve her going up stairs at the workplace.  She may contact me if light duty becomes available.  Otherwise concentrate on decreasing inflammation and resting the knee until MRI and ortho consult.  Both she and her  had full understanding of the plan and were in agreement and appreciative.        Total time spent today in chart preparation: 5 minutes  Total time spent today in face to face evaluation: 24 minutes  Total time spent today in documentation: 12 minutes.        Osmany Parrish DC, BASHIR  Director - Occupational Medicine  Department  [facility]  [address] [place] [address]  Phone [phone]  Fax [phone]  Disclaimer:  This note consists of words and symbols derived from keyboarding, dictation, or using voice recognition software. As a result, there may be errors in the script that have gone undetected. Please consider this when interpreting information found in this note.    1:05 PM 2/23/2021

## 2021-02-23 NOTE — PROGRESS NOTES
HPI:    Camille Mena is a 55 year old female who presents to clinic today for ER follow-up.  She was in the emergency room on  for left knee injury.  Incidentally she had significantly elevated blood pressure.  She was placed on lisinopril 10 mg daily.  She comes in today for follow-up on this as well has for knee injury.  Her home blood pressures have been running 170s to 200s over 100s to 130s.  She denies any symptoms related to her hypertension, no headaches shortness of breath, swelling her legs, etc.  Weights been stable.  She does smoke and drink 1-2 alcoholic beverages a week.  No routine exercise.  She states that they do eat out a lot but they do not have pop at home.    Past Medical History:   Diagnosis Date     History of other genital system and obstetric disorders      3, para 3       Current Outpatient Medications   Medication Sig Dispense Refill     lisinopril-hydrochlorothiazide (ZESTORETIC) 20-12.5 MG tablet Take 1 tablet by mouth daily 30 tablet 1       No Known Allergies    ROS:  Pertinent positives and negatives are noted in HPI.    EXAM:  BP (!) 202/130 (BP Location: Left arm, Patient Position: Sitting, Cuff Size: Adult Regular)   Pulse 81   Temp 97.7  F (36.5  C) (Tympanic)   Resp 20   SpO2 99%   General appearance: well appearing female, in no acute distress  Respiratory: clear to auscultation bilaterally  Cardiac: RRR with no murmurs  Psychological: normal affect, alert and pleasant    ASSESSMENT AND PLAN:    1. Benign essential hypertension        BMP in the emergency room with stable.  Will place her on lisinopril with hydrochlorothiazide 20-12.5 mg daily.  Encouraged her to work on smoking cessation, routine exercise and limiting sodium intake.  She will continue to monitor her blood pressures at home daily and follow-up via telephone visit or in person in 2 weeks and another follow-up in 4 weeks for recheck of labs.  Due to her knee injury being work-related,  she will follow-up with Dr. Parrish for work comp after her appointment today.    I spent approximately 40 minutes on chart review, obtaining history and physical exam, reviewing above-noted diagnostics, reviewing ER notes and previous labs and documentation.      MAURA Merida CNP..................2/23/2021 10:01 AM      This document was prepared using voice generated software.  While every attempt was made for accuracy, grammatical errors may exist.

## 2021-02-23 NOTE — PATIENT INSTRUCTIONS
Take lisinopril/HCTZ one tablet daily  Reduce sodium intake  Work on smoking cessation  Daily exercise  Monitor blood pressure daily and write down  Follow up in person or via telephone visit in 2 weeks  Follow up in person in 4 weeks for lab check      Patient Education     Low-Salt Choices  Eating salt (sodium) can make your body retain too much water. Extra water makes your heart work harder. Canned, packaged, and frozen foods are easy to prepare. But they are often high in sodium. Here are some ideas for low-salt foods you can easily make yourself.     For breakfast    Fruit or 100% fruit juice. It's better to have whole fruit instead of 100% fruit juice.    Whole-wheat bread or an English muffin. Look for sodium content on Nutrition Facts labels.    Low-fat milk or yogurt    Unsalted eggs    Shredded wheat    Corn tortillas    Unsalted steamed rice    Regular (not instant) hot cereal, made without salt  Stay away from    Sausage, vargas, and ham    Flour tortillas    Packaged muffins, pancakes, and biscuits    Instant hot cereals    Cottage cheese    For lunch and dinner    Fresh fish, chicken, turkey, or meat--baked, broiled, or roasted without salt    Dry beans, cooked without salt    Tofu, stir-fried without salt    Unsalted fresh fruit and vegetables, or frozen or canned fruit and vegetables with no added salt  Stay away from    Lunch or deli meat that is cured or smoked    Cheese    Tomato juice and ketchup    Canned vegetables, soups, and fish not labeled as no-salt-added or reduced sodium    Packaged gravies and sauces    Olives, pickles, and relish    Bottled salad dressings    For snacks and desserts    Yogurt    Unsalted, air-popped popcorn    Unsalted nuts or seeds  Stay away from    Pies and cakes    Packaged dessert mixes    Pizza    Canned and packaged puddings    Pretzels, chips, crackers, and nuts--unless the label says unsalted    [employer] last reviewed this educational content on 11/1/2019     0501-9690 [employer]. [address] [place] [address], [place] [address]. All rights reserved. This information is not intended as a substitute for professional medical care. Always follow your healthcare professional's instructions.           Patient Education     High Blood Pressure, New, Begin Treatment    Your blood pressure was high enough today to start treatment with medicines. Often healthcare providers don t know what causes high blood pressure (hypertension). But it can be controlled with lifestyle changes and medicines. High blood pressure usually has no symptoms. But it can sometimes cause headache, dizziness, blurred vision, a rushing sound in your ears, chest pain, or shortness of breath. But even without symptoms, high blood pressure that s not treated raises your risk for heart attack, heart failure, kidney disease, vascular disease, and stroke. High blood pressure is a serious health risk and shouldn t be ignored.    Blood pressure measurements are given as 2 numbers.    Systolic blood pressure is the upper number. This is the pressure when the heart contracts.    Diastolic blood pressure is the lower number. This is the pressure when the heart relaxes between beats.  You will see your blood pressure readings written together. For example, a person with a systolic pressure of 118 and a diastolic pressure of 78 will have 118/78 written in the medical record.   Blood pressure is categorized as normal, elevated, or stage 1 or stage 2 high blood pressure:    Normal blood pressure is systolic of less than 120 and diastolic of less than 80 (120/80)    Elevated blood pressure is systolic of 120 to 129 and diastolic less than 80    Stage 1 high blood pressure is systolic is 130 to 139 or diastolic between 80 to 89    Stage 2 high blood pressure is when systolic is 140 or higher or the diastolic is 90 or higher  Home care  If you have high blood pressure, do what's listed below to lower your blood  pressure. If you are taking medicines for high blood pressure, these methods may reduce or end your need for medicines in the future.    Begin a weight-loss program if you are overweight.    Cut back on how much salt you get in your diet. Here s how to do this:  ? Don t eat foods that have a lot of salt. These include olives, pickles, smoked meats, and salted potato chips.  ? Don t add salt to your food at the table.  ? Use only small amounts of salt when cooking.  ? Review food labels to track how much salt is in prepared foods.  ? When eating out, ask that no additional salt be added to your food order.  ? Ask your provider about the DASH diet or the DASH (dietary approaches to stop hypertension) eating plan.    Start an exercise program. Talk with your healthcare provider about the type of exercise program that would be best for you. It doesn't have to be hard. Even brisk walking for 20 minutes 3 times a week is a good form of exercise.    Don t take medicines that have heart stimulants. This includes many over-the-counter cold and sinus decongestant pills and sprays, as well as diet pills. Check the warnings about high blood pressure on the label. Before purchasing any over-the-counter medicines or supplements, always ask the pharmacist about the product's potential interaction with your high blood pressure and your high blood pressure medicines.    Stimulants such as amphetamine or cocaine could be lethal for someone with high blood pressure. Never take these.    Limit how much caffeine you get in your diet. Switch to caffeine-free products.    Stop smoking. If you are a long-time smoker, this can be hard. Enroll in a stop-smoking program to make it more likely that you will quit for good. Or, talk with your healthcare provider about nicotine replacements or medicines that can help.    Learn how to handle stress. This is an important part of any program to lower blood pressure. Learn about relaxation methods  like meditation, yoga, or biofeedback.    If your provider prescribed medicines, take them exactly as directed. Missing doses may cause your blood pressure to get out of control.    If you miss a dose or doses, check with your healthcare provider or pharmacist about what to do.    Limit alcohol. Drinking too much alcohol can raise blood pressure. Men should have no more than 2 drinks a day. Women should have no more than 1. A drink is equal to 1 beer, or a small glass of wine, or a shot of liquor..    Consider buying an automatic blood pressure machine so you can check your blood pressure regularly at home. Your provider can make a recommendation. You can get one of these at most pharmacies.  The American Heart Association recommends the following guidelines for home blood pressure monitoring:    Don't smoke or drink coffee or other caffeinated drinks or exercise for 30 minutes before taking your blood pressure.    Go to the bathroom before the test.    Relax for 5 minutes before taking the measurement.    Sit with your back supported (don't sit on a couch or soft chair); keep your feet on the floor uncrossed. Place your arm on a solid flat surface (like a table) with the upper part of the arm at heart level. Place the middle of the cuff directly above the bend of the elbow. Check the monitor's instruction manual for an illustration.    Take multiple readings. When you measure, take 2 to 3 readings one minute apart and record all of the results.    Take your blood pressure at the same time every day, or as your healthcare provider recommends.    Record the date, time, and blood pressure reading.    Take the record with you to your next medical appointment. If your blood pressure monitor has a built-in memory, simply take the monitor with you to your next appointment.    Call your provider if you have several high readings. Don't be frightened by a single high blood pressure reading, but if you get several high  readings, check in with your healthcare provider.    Note: If blood pressure reaches a systolic (top number) of 180 or higher OR diastolic (bottom number) of 120 or higher, seek emergency medical treatment.  Follow-up care  Because a new blood pressure medicine was started today, it s important that you have your blood pressure rechecked. This is to make sure that the medicine is working and that you have no serious side effects. Keep all your follow up appointments. Write down medicine and blood pressure questions and bring them to your next appointment. If you have pressing concerns about your new medicine or your blood pressure, call your provider. Unless told otherwise, follow up with your healthcare provider within the next 3 days.  When to seek medical care  Call your healthcare provider right away if any of these occur:    Blood pressure reaches a systolic (top number) of 180 or higher, OR diastolic (bottom number) of 120 or higher, seek emergency medical treatment.    Chest pain or shortness of breath    Severe headache    Throbbing or rushing sound in the ears    Nosebleed    Sudden severe pain in your belly (abdomen)    Extreme drowsiness, confusion, or fainting    Dizziness or dizziness with a spinning sensation (vertigo)    Weakness of an arm or leg or one side of the face    You have problems speaking or seeing   [employer] last reviewed this educational content on 12/1/2019 2000-2020 [employer]. [address] [place] [address], [place] [address]. All rights reserved. This information is not intended as a substitute for professional medical care. Always follow your healthcare professional's instructions.

## 2021-02-23 NOTE — NURSING NOTE
Camille Mena is a 55 year old female presenting for an initial work comp evaluation for the following injuries:  DATE OF INJURY: 2/19/21  Employer: [employer]  Medication Reconciliation: complete    Review Of Systems  Skin: negative  Eyes: negative  Ears/Nose/Throat: negative  Respiratory: No shortness of breath, dyspnea on exertion, cough, or hemoptysis  Cardiovascular: negative  Gastrointestinal: negative  Genitourinary: negative  Musculoskeletal: positive for injury to knee  Neurologic: negative  Psychiatric: negative  Hematologic/Lymphatic/Immunologic: negative  Endocrine: negative     Dipti Vital LPN  2/23/2021 10:54 AM

## 2021-02-23 NOTE — NURSING NOTE
"Chief Complaint   Patient presents with     RECHECK     blood pressure     Patient presents to clinic for ED f/u. She states she is concerned about her bp, she started lisinopril 10mg in the morning and she did take it this morning. She states she has not had a reading under 178/101 since leaving ED.    Initial BP (!) 202/130 (BP Location: Left arm, Patient Position: Sitting, Cuff Size: Adult Regular)   Pulse 81   Temp 97.7  F (36.5  C) (Tympanic)   Resp 20   SpO2 99%  Estimated body mass index is 24.89 kg/m  as calculated from the following:    Height as of 2/28/18: 1.626 m (5' 4\").    Weight as of 2/20/21: 65.8 kg (145 lb).         Medication Reconciliation: Complete      Nan Del Valle   "

## 2021-03-02 ENCOUNTER — HOSPITAL ENCOUNTER (OUTPATIENT)
Dept: MRI IMAGING | Facility: OTHER | Age: 56
Discharge: HOME | End: 2021-03-02
Attending: CHIROPRACTOR | Admitting: CHIROPRACTOR
Payer: COMMERCIAL

## 2021-03-02 PROCEDURE — 73721 MRI JNT OF LWR EXTRE W/O DYE: CPT | Mod: LT
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
## 2021-03-03 DIAGNOSIS — S89.92XA KNEE INJURY, LEFT, INITIAL ENCOUNTER: ICD-10-CM

## 2021-03-03 NOTE — RESULT ENCOUNTER NOTE
Patient notified after name and date of birth were verified. Patient's work restrictions  tomorrow and patient encouraged to make appointment for tomorrow to update workability.  Dipti Vital LPN  3/3/2021 9:36 AM

## 2021-03-04 ENCOUNTER — PATIENT OUTREACH (OUTPATIENT)
Dept: FAMILY MEDICINE | Facility: OTHER | Age: 56
End: 2021-03-04

## 2021-03-04 ENCOUNTER — OFFICE VISIT (OUTPATIENT)
Dept: FAMILY MEDICINE | Facility: OTHER | Age: 56
End: 2021-03-04
Attending: CHIROPRACTOR
Payer: OTHER MISCELLANEOUS

## 2021-03-04 VITALS
HEART RATE: 75 BPM | DIASTOLIC BLOOD PRESSURE: 86 MMHG | RESPIRATION RATE: 16 BRPM | SYSTOLIC BLOOD PRESSURE: 138 MMHG | TEMPERATURE: 98.1 F | OXYGEN SATURATION: 97 %

## 2021-03-04 DIAGNOSIS — S89.92XA KNEE INJURY, LEFT, INITIAL ENCOUNTER: ICD-10-CM
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
## 2021-03-04 PROCEDURE — 99213 OFFICE O/P EST LOW 20 MIN: CPT | Performed by: CHIROPRACTOR

## 2021-03-04 ASSESSMENT — PAIN SCALES - GENERAL: PAINLEVEL: NO PAIN (0)

## 2021-03-04 NOTE — NURSING NOTE
Camille Mena is a 55 year old female presenting for a work comp follow up for:Left knee injury and MRI follow up  DATE OF INJURY: 2/19/21    Medication Reconciliation: complete    Dipti Vital LPN  3/4/2021 10:26 AM

## 2021-03-04 NOTE — LETTER
March 4, 2021      Camille Mena  [address] [facility] [address]      Your healthcare team cares about your health. To provide you with the best care,   we have reviewed your chart and based on our findings, we see that you are due to:     - BREAST CANCER SCREENING:  Please call to Schedule Annual Mammogram at [phone].    - CERVICAL CANCER SCREENING: Schedule a Cervical Cancer Screening, with Pap and wellness exam.   - COLON CANCER SCREENING:  Call the clinic to schedule your colonoscopy or Cologuard test.  - OTHER FOLLOW UP:  Preventative exam and immunizations    If you have already completed these items, please contact the clinic via phone or   [employer] so your care team can review and update your records. Thank you for   choosing [facility] for your healthcare needs. For any questions,   concerns, or to schedule an appointment please contact the clinic.       Healthy Regards,      Your [facility] Care Team          Enclosure: N/A

## 2021-03-04 NOTE — PROGRESS NOTES
Chief Complaint   Patient presents with     Work Comp     left knee       HISTORY OF PRESENTING WORK INJURY     Camille is here for workability update.  She reports overall improvement and has been following resting and icing instructions.  She was called yesterday for an orthopedic appointment but has not yet made one.  She states she is able to bear weight on the knee and go up/down stairs.  There is still pain associated with bearing weight and associated swelling.      PAST MEDICAL HISTORY:  Past Medical History:   Diagnosis Date     History of other genital system and obstetric disorders      3, para 3       PAST SURGICAL HISTORY:  Past Surgical History:   Procedure Laterality Date     LAPAROSCOPIC TUBAL LIGATION      No Comments Provided       ALLERGIES:  No Known Allergies    CURRENT MEDICATIONS:  Current Outpatient Medications   Medication Sig Dispense Refill     lisinopril-hydrochlorothiazide (ZESTORETIC) 20-12.5 MG tablet Take 1 tablet by mouth daily 30 tablet 1       SOCIAL HISTORY:  Social History     Socioeconomic History     Marital status: Single     Spouse name: Not on file     Number of children: Not on file     Years of education: Not on file     Highest education level: Not on file   Occupational History     Not on file   Social Needs     Financial resource strain: Not on file     Food insecurity     Worry: Not on file     Inability: Not on file     Transportation needs     Medical: Not on file     Non-medical: Not on file   Tobacco Use     Smoking status: Current Every Day Smoker     Packs/day: 0.50     Smokeless tobacco: Never Used   Substance and Sexual Activity     Alcohol use: Yes     Alcohol/week: 2.0 - 4.0 standard drinks     Types: 2 - 4 Cans of beer per week     Frequency: 2-4 times a month     Comment: pt drinks once a week     Drug use: No     Sexual activity: Yes     Partners: Male   Lifestyle     Physical activity     Days per week: Not on file     Minutes per session: Not  on file     Stress: Not on file   Relationships     Social connections     Talks on phone: Not on file     Gets together: Not on file     Attends Temple service: Not on file     Active member of club or organization: Not on file     Attends meetings of clubs or organizations: Not on file     Relationship status: Not on file     Intimate partner violence     Fear of current or ex partner: Not on file     Emotionally abused: Not on file     Physically abused: Not on file     Forced sexual activity: Not on file   Other Topics Concern     Not on file   Social History Narrative    Lives in town.  .  Works at [employer].       FAMILY HISTORY:  History reviewed. No pertinent family history.    REVIEW OF SYSTEMS:    Unchanged from previous examination    PHYSICAL EXAM:   /86   Pulse 75   Temp 98.1  F (36.7  C) (Tympanic)   Resp 16   SpO2 97%   Breastfeeding No  There is no height or weight on file to calculate BMI. General Appearance: Pleasant, alert, appropriate appearance for age. No acute distress.  She demonstrates weightbearing.  Range of motion continues to be moderately restricted.  There is pain noted at the medial aspect of the knee as well as at the suprapatellar region.    MRI results:    Study Result    Exam: MR KNEE LEFT W/O CONTRAST     HISTORY:  Work place accident; Knee injury, left, initial encounter.      Technique: Axial, coronal and sagittal images were obtained with  combination of proton density and T2-weighted images.     Findings: Anterior and posterior cruciate ligaments are intact as are  medial and lateral collateral ligaments and the extensor mechanism.     There is a small undersurface tear through the body of the medial  meniscus. There is moderate thinning of medial articular cartilage  with mild subchondral marrow edema adjacent to the medial tibial  spine. Subchondral cystic changes seen as well.     There is a small undersurface tear through the periphery of the body  of  the lateral meniscus. There is mild to moderate thinning of lateral  cartilage with severe loss over the medial aspect of the lateral  tibial plateau posteriorly. This is associated with mild subchondral  marrow change.     There is mild thinning of posterior patellar articular cartilage with  moderate thinning of cartilage in the trochlear groove. There is a  large joint effusion. There is no popliteal cyst.                                                                           Impression:   1. Small tears of medial and lateral menisci.  2. Moderate tricompartmental degenerative change.  3. Large joint effusion.     SIVAKUMAR LAWS MD     These were shown and discussed with the patient.        IMPRESSION/PLAN:    Continue with initial recommendation orthopedic consult.  We will assist her in making that appointment today with [facility].  I am comfortable with her returning to work at light duty capacity with a sitting/standing restriction.  She was agreeable to this and signed the workability form, copies were given.  His restrictions will be in effect until she is seen by ortho.  Camille was encouraged to continue with icing protocols at home to control swelling and had no further questions after today's visit.      Total time spent today in chart preparation: 5 minutes  Total time spent today in face to face evaluation: 16 minutes  Total time spent today in documentation: 8 minutes.        Osmany Parrish DC, BASHIR  Director - Occupational [place] [address]  Phone [phone]  Fax [phone]  Disclaimer:  This note consists of words and symbols derived from keyboarding, dictation, or using voice recognition software. As a result, there may be errors in the script that have gone undetected. Please consider this when interpreting information found in this note.    11:08 AM 3/4/2021

## 2021-03-04 NOTE — TELEPHONE ENCOUNTER
Patient Quality Outreach      Summary:    Patient has the following on her problem list/HM: None    Patient is due/failing the following:   Colonoscopy, Breast Cancer Screening - Mammogram, Cervical Cancer Screening - PAP Needed, Adult/Adolescent physical, date due: overdue and Immunizations    Type of outreach:    Sent [employer] message.    Questions for provider review:    None                                                                                                                                     Dipti Vital LPN  3/4/2021 2:34 PM      Chart routed to Care Team.

## 2021-03-09 ENCOUNTER — VIRTUAL VISIT (OUTPATIENT)
Dept: FAMILY MEDICINE | Facility: OTHER | Age: 56
End: 2021-03-09
Attending: NURSE PRACTITIONER
Payer: COMMERCIAL

## 2021-03-09 VITALS
SYSTOLIC BLOOD PRESSURE: 142 MMHG | WEIGHT: 145 LBS | DIASTOLIC BLOOD PRESSURE: 98 MMHG | HEIGHT: 64 IN | BODY MASS INDEX: 24.75 KG/M2

## 2021-03-09 DIAGNOSIS — I10 BENIGN ESSENTIAL HYPERTENSION: Primary | ICD-10-CM

## 2021-03-09 PROCEDURE — 99212 OFFICE O/P EST SF 10 MIN: CPT | Mod: 95 | Performed by: NURSE PRACTITIONER

## 2021-03-09 RX ORDER — LISINOPRIL AND HYDROCHLOROTHIAZIDE 20; 25 MG/1; MG/1
1 TABLET ORAL DAILY
Qty: 30 TABLET | Refills: 0 | Status: SHIPPED | OUTPATIENT
Start: 2021-03-09 | End: 2021-03-23 | Stop reason: DRUGHIGH

## 2021-03-09 SDOH — HEALTH STABILITY: MENTAL HEALTH: HOW OFTEN DO YOU HAVE A DRINK CONTAINING ALCOHOL?: MONTHLY OR LESS

## 2021-03-09 ASSESSMENT — PAIN SCALES - GENERAL: PAINLEVEL: NO PAIN (0)

## 2021-03-09 ASSESSMENT — MIFFLIN-ST. JEOR: SCORE: 1227.72

## 2021-03-09 NOTE — PROGRESS NOTES
Camille is a 55 year old who is being evaluated via a billable telephone visit.      What phone number would you like to be contacted at? [phone]  How would you like to obtain your AVS? Mail a copy    Assessment & Plan     Benign essential hypertension  Essential hypertension that remains over goal.  Will increase hydrochlorthiazide 25 mg daily in the form of lisinopril 20 mg-hydrochlorthiazide 25 mg tablet daily.  Should continue to monitor blood pressures at home daily.  She does have follow-up with myself in a couple weeks and will review her blood pressures at that time.  - lisinopril-hydrochlorothiazide (ZESTORETIC) 20-25 MG tablet  Dispense: 30 tablet; Refill: 0    Subjective   Camille is a 55 year old who presents via telephone visit for hypertension    HPI     Follow-up was originally seen on 5/23 for essential hypertension, ER follow-up.  She been emergency room for a left knee injury and is still is noted to have elevated blood pressure.  She was placed on lisinopril 10 mg daily.  All blood pressures at that time are running 170s to 200s over 100s to 130s.  She reports her home blood pressures since increasing lisinopril with hydrochlorothiazide have been 140s 150s over 86-12.  This is significantly improved from previous.  She is not having any side effects to medication.      Review of Systems   See above      Objective    Vitals - Patient Reported  Pain Score: No Pain (0)        Physical Exam   healthy, alert and no distress  PSYCH: Alert and oriented times 3; coherent speech, normal   rate and volume, able to articulate logical thoughts, able   to abstract reason, no tangential thoughts, no hallucinations   or delusions  Her affect is normal  RESP: No cough, no audible wheezing, able to talk in full sentences  Remainder of exam unable to be completed due to telephone visits                Phone call duration: 5 minutes

## 2021-03-15 ENCOUNTER — OFFICE VISIT (OUTPATIENT)
Dept: ORTHOPEDICS | Facility: OTHER | Age: 56
End: 2021-03-15
Attending: CHIROPRACTOR
Payer: OTHER MISCELLANEOUS

## 2021-03-15 VITALS
WEIGHT: 145 LBS | BODY MASS INDEX: 24.75 KG/M2 | DIASTOLIC BLOOD PRESSURE: 100 MMHG | RESPIRATION RATE: 16 BRPM | HEART RATE: 72 BPM | HEIGHT: 64 IN | OXYGEN SATURATION: 99 % | SYSTOLIC BLOOD PRESSURE: 168 MMHG
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
## 2021-03-15 DIAGNOSIS — S89.92XA KNEE INJURY, LEFT, INITIAL ENCOUNTER: ICD-10-CM

## 2021-03-15 DIAGNOSIS — M17.12 PRIMARY OSTEOARTHRITIS OF LEFT KNEE: Primary | ICD-10-CM

## 2021-03-15 PROCEDURE — 99203 OFFICE O/P NEW LOW 30 MIN: CPT | Performed by: ORTHOPAEDIC SURGERY

## 2021-03-15 ASSESSMENT — MIFFLIN-ST. JEOR: SCORE: 1227.72

## 2021-03-15 ASSESSMENT — PAIN SCALES - GENERAL: PAINLEVEL: NO PAIN (0)

## 2021-03-15 NOTE — PROGRESS NOTES
Visit Date:   03/15/2021      A 55-year-old female with left knee pain.      HISTORY OF PRESENT ILLNESS:  Camille Mena is a 55-year-old female who was injured at work on 02/19, when she was walking up to bag some groceries at the [employer] store.  She struck her left knee on the counter and had significant pain.  She had difficulty walking after this, primarily due to the significant swelling in her knee.  MRI was performed for concerns of internal derangement.  She was found to have fairly significant tricompartmental degenerative change as well as a large joint effusion and some very small undersurface medial and lateral meniscus tears.  She was asked to follow-up with Orthopedics to primarily address these issues.  She states that her left knee really gave her no problems and up until she smacked it.      PAST MEDICAL HISTORY:  Noncontributory.      SURGICAL HISTORY:  Noncontributory.      MEDICATIONS:  Noncontributory.      SOCIAL HISTORY:  She does smoke and very moderate alcohol use.      PHYSICAL EXAMINATION:     GENERAL:  Today, this is a 55-year-old female in no acute distress, very pleasant on examination.     EXTREMITIES:  Has fairly significant swelling about the knee.  Extensor mechanism is intact.  She has active range of motion, lacking only about 10 degrees of extension and has about 90 degrees of flexion of the left knee at this point.  She is stable to varus and valgus stress.  Negative Lachman's, negative posterior drawer.  She is nontender to palpation medial and lateral joint lines.  She is neuro and vascularly intact otherwise.  No real signs of trauma at this point in the left knee.      Review of the MRI shows a large effusion on the knee.  There is moderate degenerative change, tricompartmental in nature.  Small what appears to be degenerative meniscus tears.  To me, these do not appear to be too significant.  There is some cartilage degeneration appreciated medial and lateral  joint spaces.      IMPRESSION AND PLAN:  This is a 55-year-old female with some small meniscus tears.  Of course, banging her knee on a counter is likely not going to give meniscus tears in the knees nor the degenerative change, which has likely been there considerably longer than this injury.  Regardless, her biggest issue at this point with her knee nearly completely out of pain.  Her biggest issue at this point is range of motion and given that her knee was doing just fine before she hit it at work, I think it is reasonable to get her into Physical Therapy for this.  I do not think that I would take her to surgery for the meniscus tears at this time.  We are going to go ahead and get her into physical therapy, get her range of motion back and then if her knee pain comes back, we will consider a knee arthroscopy with partial medial and lateral meniscectomies on a non-Work Comp basis.  We will see her back in approximately a month.         BARBY AYERS MD             D: 03/15/2021   T: 03/15/2021   MT: GABO      Name:     AGUSTIN DE GUZMAN   MRN:      [id number]        Account:      [id number]   :      1965           Visit Date:   03/15/2021      Document: [id number]

## 2021-03-15 NOTE — PROGRESS NOTES
"Chief Complaint   Patient presents with     Work Comp     Left knee - doi- 2/19/21       Initial BP (!) 168/100 (BP Location: Right arm, Patient Position: Sitting, Cuff Size: Adult Regular)   Pulse 72   Resp 16   Ht 1.626 m (5' 4\")   Wt 65.8 kg (145 lb)   SpO2 99%   BMI 24.89 kg/m   Estimated body mass index is 24.89 kg/m  as calculated from the following:    Height as of this encounter: 1.626 m (5' 4\").    Weight as of this encounter: 65.8 kg (145 lb).  Medication Reconciliation: complete    Brenda Bearden LPN    "

## 2021-03-23 ENCOUNTER — OFFICE VISIT (OUTPATIENT)
Dept: FAMILY MEDICINE | Facility: OTHER | Age: 56
End: 2021-03-23
Attending: NURSE PRACTITIONER
Payer: COMMERCIAL

## 2021-03-23 VITALS
OXYGEN SATURATION: 100 % | WEIGHT: 154.6 LBS | TEMPERATURE: 97.6 F | BODY MASS INDEX: 26.4 KG/M2 | SYSTOLIC BLOOD PRESSURE: 158 MMHG | RESPIRATION RATE: 17 BRPM | HEART RATE: 77 BPM | DIASTOLIC BLOOD PRESSURE: 110 MMHG | HEIGHT: 64 IN

## 2021-03-23 DIAGNOSIS — I10 BENIGN ESSENTIAL HYPERTENSION: ICD-10-CM

## 2021-03-23 LAB
ANION GAP SERPL CALCULATED.3IONS-SCNC: 7 MMOL/L (ref 3–14)
BUN SERPL-MCNC: 14 MG/DL (ref 7–25)
CALCIUM SERPL-MCNC: 9.4 MG/DL (ref 8.6–10.3)
CHLORIDE SERPL-SCNC: 100 MMOL/L (ref 98–107)
CO2 SERPL-SCNC: 28 MMOL/L (ref 21–31)
CREAT SERPL-MCNC: 0.87 MG/DL (ref 0.6–1.2)
GFR SERPL CREATININE-BSD FRML MDRD: 67 ML/MIN/{1.73_M2}
GLUCOSE SERPL-MCNC: 92 MG/DL (ref 70–105)
POTASSIUM SERPL-SCNC: 4.3 MMOL/L (ref 3.5–5.1)
SODIUM SERPL-SCNC: 135 MMOL/L (ref 134–144)

## 2021-03-23 PROCEDURE — 80048 BASIC METABOLIC PNL TOTAL CA: CPT | Mod: ZL | Performed by: NURSE PRACTITIONER

## 2021-03-23 PROCEDURE — 36415 COLL VENOUS BLD VENIPUNCTURE: CPT | Mod: ZL | Performed by: NURSE PRACTITIONER

## 2021-03-23 PROCEDURE — 99213 OFFICE O/P EST LOW 20 MIN: CPT | Performed by: NURSE PRACTITIONER

## 2021-03-23 RX ORDER — AMLODIPINE BESYLATE 5 MG/1
5 TABLET ORAL DAILY
Qty: 90 TABLET | Refills: 3 | Status: SHIPPED | OUTPATIENT
Start: 2021-03-23 | End: 2022-09-15

## 2021-03-23 RX ORDER — LISINOPRIL AND HYDROCHLOROTHIAZIDE 12.5; 2 MG/1; MG/1
1 TABLET ORAL DAILY
Qty: 90 TABLET | Refills: 3 | Status: SHIPPED | OUTPATIENT
Start: 2021-03-23 | End: 2022-05-03

## 2021-03-23 ASSESSMENT — MIFFLIN-ST. JEOR: SCORE: 1271.26

## 2021-03-23 ASSESSMENT — PAIN SCALES - GENERAL: PAINLEVEL: NO PAIN (0)

## 2021-03-23 NOTE — PATIENT INSTRUCTIONS
Change medication to lisinopril-hydrochlorothiazide 20-12.5 and start amlodipine 5 mg daily  Monitor blood pressure a couple times a week  Goal: under 140/90  Schedule routine physical and recheck of blood pressure in 1-2 months

## 2021-03-23 NOTE — NURSING NOTE
"Chief Complaint   Patient presents with     RECHECK     hypertension     Patient presents to clinic for bp follow-up. She states that her reading have been up and down, but still high.    Initial BP (!) 158/110 (BP Location: Right arm, Patient Position: Sitting, Cuff Size: Adult Regular)   Pulse 77   Temp 97.6  F (36.4  C) (Tympanic)   Resp 17   Ht 1.626 m (5' 4\")   Wt 70.1 kg (154 lb 9.6 oz)   SpO2 100%   Breastfeeding No   BMI 26.54 kg/m   Estimated body mass index is 26.54 kg/m  as calculated from the following:    Height as of this encounter: 1.626 m (5' 4\").    Weight as of this encounter: 70.1 kg (154 lb 9.6 oz).         Medication Reconciliation: Complete      Nan Del Valle   "

## 2021-03-23 NOTE — PROGRESS NOTES
"HPI:    Camille Mena is a 55 year old female who presents to clinic today for hypertension.  She was originally seen for an ER visit regarding the injury and was noted to have hypertension.  She followed up with me 3 days later and had adjustments of blood pressure medications.  We then did a virtual visit on .  Her blood pressure continues to be moderately elevated in the 150s over 90s to 100s.  She noted when the chlorthalidone was increased she started having some dizziness and nauseousness.  She does continue to smoke but has cut down on this.  Denies any side effects from medication other than noted above.    Past Medical History:   Diagnosis Date     History of other genital system and obstetric disorders      3, para 3       Current Outpatient Medications   Medication Sig Dispense Refill     amLODIPine (NORVASC) 5 MG tablet Take 1 tablet (5 mg) by mouth daily 90 tablet 3     lisinopril-hydrochlorothiazide (ZESTORETIC) 20-12.5 MG tablet Take 1 tablet by mouth daily 90 tablet 3       No Known Allergies    ROS:  Pertinent positives and negatives are noted in HPI.    EXAM:  BP (!) 158/110 (BP Location: Right arm, Patient Position: Sitting, Cuff Size: Adult Regular)   Pulse 77   Temp 97.6  F (36.4  C) (Tympanic)   Resp 17   Ht 1.626 m (5' 4\")   Wt 70.1 kg (154 lb 9.6 oz)   SpO2 100%   Breastfeeding No   BMI 26.54 kg/m    General appearance: well appearing female, in no acute distress  Respiratory: clear to auscultation bilaterally  Cardiac: RRR with no murmurs, no lower extremity edema  Psychological: normal affect, alert and pleasant  Results for orders placed or performed in visit on 21   Basic Metabolic Panel     Status: None   Result Value Ref Range    Sodium 135 134 - 144 mmol/L    Potassium 4.3 3.5 - 5.1 mmol/L    Chloride 100 98 - 107 mmol/L    Carbon Dioxide 28 21 - 31 mmol/L    Anion Gap 7 3 - 14 mmol/L    Glucose 92 70 - 105 mg/dL    Urea Nitrogen 14 7 - 25 mg/dL    " Creatinine 0.87 0.60 - 1.20 mg/dL    GFR Estimate 67 >60 mL/min/[1.73_m2]    GFR Estimate If Black 81 >60 mL/min/[1.73_m2]    Calcium 9.4 8.6 - 10.3 mg/dL       ASSESSMENT AND PLAN:    1. Benign essential hypertension        She is due for labs to check kidney functions and potassium level given her start of blood pressure medications over the past 1 to 2 months.  These are normal as noted above.  Blood pressure is not at goal.  Due to her side effects we will decrease the chlorthalidone back to 12.5 mg, continue lisinopril 20 mg daily and add amlodipine 5 mg daily.  She will continue to monitor blood pressures 2-3 times a week.  She is encouraged to follow-up in 1 to 2 months for recheck as well as a routine physical.  Plan to schedule this at her convenience.    MAURA Merida CNP..................3/23/2021 10:59 AM      This document was prepared using voice generated software.  While every attempt was made for accuracy, grammatical errors may exist.

## 2021-05-20 ENCOUNTER — PATIENT OUTREACH (OUTPATIENT)
Dept: FAMILY MEDICINE | Facility: OTHER | Age: 56
End: 2021-05-20

## 2021-05-20 NOTE — TELEPHONE ENCOUNTER
Patient Quality Outreach 2nd Attempt      Summary:    Type of outreach:    Sent letter.    Next Steps:  Reach out within 90 days via Letter.    Max number of attempts reached: No. Will try again in 90 days if patient still on fail list.    Questions for provider review:    None                                                                                                                    Nan Del Valle LPN.......  5/20/2021  3:05 PM

## 2021-05-20 NOTE — LETTER
[facility]  [address]  [place] [id number] [phone]       May 20, 2021    Camille Mena  [address]   [place] [address]    Dear Camille,    We care about your health and have reviewed your health plan and are making recommendations based on this review, to optimize your health.    You are in particular need of attention regarding:  -Colon Cancer Screening    We are recommending that you:  -schedule a COLONOSCOPY to look for colon cancer (due every 10 years or 5 years in higher risk situations.)        Colon cancer is now the second leading cause of cancer-related deaths in the [facility] for both men and women and there are over 130,000 new cases and 50,000 deaths per year from colon cancer.  Colonoscopies can prevent 90-95% of these deaths.  Problem lesions can be removed before they ever become cancer.  This test is not only looking for cancer, but also getting rid of precancerious lesions.    If you are under/uninsured, we recommend you contact the [employer] program. [employer] is a free colorectal cancer screening program that provides colonoscopies for eligible under/uninsured [place] men and women. If you are interested in receiving a free colonoscopy, please call [employer] at [phone] (mention code [employer]) to see if you re eligible.      If you do not wish to do a colonoscopy or cannot afford to do one, at this time, there is another option. It is called a FIT test or Fecal Immunochemical Occult Blood Test (take home stool sample kit).  It does not replace the colonoscopy for colorectal cancer screening, but it can detect hidden bleeding in the lower colon.  It does need to be repeated every year and if a positive result is obtained, you would be referred for a colonoscopy.          If you have completed either one of these tests at another facility, please call with the details of when and where the tests were done and if they  were normal or not. Or have the records sent to our clinic so that we can best coordinate your care.      In addition, here is a list of due or overdue Health Maintenance reminders.    Health Maintenance Due   Topic Date Due     Preventive Care Visit  Never done     Discuss Advance Care Planning  Never done     Mammogram  Never done     Pneumococcal Vaccine (1 of 2 - PPSV23) Never done     Colorectal Cancer Screening  Never done     COVID-19 Vaccine (1) Never done     HIV Screening  Never done     Hepatitis C Screening  Never done     PAP Smear  Never done     Diptheria Tetanus Pertussis (DTAP/TDAP/TD) Vaccine (1 - Tdap) Never done     Cholesterol Lab  Never done     Zoster (Shingles) Vaccine (1 of 2) Never done       To address the above recommendations, we encourage you to contact us at [phone], or via [employer]. They will assist you with finding the most convenient time and location.    Thank you for trusting [facility] [place] and we appreciate the opportunity to serve you.  We look forward to supporting your healthcare needs in the future.    Healthy Regards,    Your [facility] Team

## 2022-03-25 PROBLEM — K21.9 ESOPHAGEAL REFLUX: Status: ACTIVE | Noted: 2022-03-25

## 2022-03-29 ENCOUNTER — OFFICE VISIT (OUTPATIENT)
Dept: FAMILY MEDICINE | Facility: OTHER | Age: 57
End: 2022-03-29
Attending: NURSE PRACTITIONER
Payer: COMMERCIAL

## 2022-03-29 ENCOUNTER — HOSPITAL ENCOUNTER (OUTPATIENT)
Dept: GENERAL RADIOLOGY | Facility: OTHER | Age: 57
Discharge: HOME | End: 2022-03-29
Attending: NURSE PRACTITIONER
Payer: COMMERCIAL

## 2022-03-29 VITALS
DIASTOLIC BLOOD PRESSURE: 74 MMHG | OXYGEN SATURATION: 99 % | SYSTOLIC BLOOD PRESSURE: 136 MMHG | TEMPERATURE: 97.5 F | BODY MASS INDEX: 25.37 KG/M2 | HEART RATE: 98 BPM | WEIGHT: 147.8 LBS

## 2022-03-29 DIAGNOSIS — Z12.31 ENCOUNTER FOR SCREENING MAMMOGRAM FOR BREAST CANCER: ICD-10-CM

## 2022-03-29 DIAGNOSIS — M19.90 INFLAMMATORY ARTHRITIS: ICD-10-CM

## 2022-03-29 DIAGNOSIS — M25.561 PAIN AND SWELLING OF RIGHT KNEE: ICD-10-CM

## 2022-03-29 DIAGNOSIS — I10 ESSENTIAL HYPERTENSION: ICD-10-CM

## 2022-03-29 DIAGNOSIS — M25.561 PAIN AND SWELLING OF RIGHT KNEE: Primary | ICD-10-CM

## 2022-03-29 DIAGNOSIS — M25.461 PAIN AND SWELLING OF RIGHT KNEE: Primary | ICD-10-CM

## 2022-03-29 DIAGNOSIS — M25.461 PAIN AND SWELLING OF RIGHT KNEE: ICD-10-CM

## 2022-03-29 DIAGNOSIS — Z13.220 LIPID SCREENING: ICD-10-CM

## 2022-03-29 LAB
BASOPHILS # BLD AUTO: 0.1 10E3/UL (ref 0–0.2)
BASOPHILS NFR BLD AUTO: 1 %
CHOLEST SERPL-MCNC: 128 MG/DL
CRP SERPL-MCNC: 28 MG/L
EOSINOPHIL # BLD AUTO: 0.1 10E3/UL (ref 0–0.7)
EOSINOPHIL NFR BLD AUTO: 1 %
ERYTHROCYTE [DISTWIDTH] IN BLOOD BY AUTOMATED COUNT: 15.9 % (ref 10–15)
FASTING STATUS PATIENT QL REPORTED: NORMAL
HCT VFR BLD AUTO: 39.7 % (ref 35–47)
HDLC SERPL-MCNC: 51 MG/DL (ref 23–92)
HGB BLD-MCNC: 13 G/DL (ref 11.7–15.7)
IMM GRANULOCYTES # BLD: 0.1 10E3/UL
IMM GRANULOCYTES NFR BLD: 0 %
LDLC SERPL CALC-MCNC: 57 MG/DL
LYMPHOCYTES # BLD AUTO: 2.2 10E3/UL (ref 0.8–5.3)
LYMPHOCYTES NFR BLD AUTO: 20 %
MCH RBC QN AUTO: 28.1 PG (ref 26.5–33)
MCHC RBC AUTO-ENTMCNC: 32.7 G/DL (ref 31.5–36.5)
MCV RBC AUTO: 86 FL (ref 78–100)
MONOCYTES # BLD AUTO: 0.8 10E3/UL (ref 0–1.3)
MONOCYTES NFR BLD AUTO: 7 %
NEUTROPHILS # BLD AUTO: 7.9 10E3/UL (ref 1.6–8.3)
NEUTROPHILS NFR BLD AUTO: 71 %
NONHDLC SERPL-MCNC: 77 MG/DL
NRBC # BLD AUTO: 0 10E3/UL
NRBC BLD AUTO-RTO: 0 /100
PLATELET # BLD AUTO: 406 10E3/UL (ref 150–450)
RBC # BLD AUTO: 4.63 10E6/UL (ref 3.8–5.2)
TRIGL SERPL-MCNC: 100 MG/DL
WBC # BLD AUTO: 11.1 10E3/UL (ref 4–11)

## 2022-03-29 PROCEDURE — 80061 LIPID PANEL: CPT | Mod: ZL | Performed by: NURSE PRACTITIONER

## 2022-03-29 PROCEDURE — 99214 OFFICE O/P EST MOD 30 MIN: CPT | Performed by: NURSE PRACTITIONER

## 2022-03-29 PROCEDURE — 86140 C-REACTIVE PROTEIN: CPT | Mod: ZL | Performed by: NURSE PRACTITIONER

## 2022-03-29 PROCEDURE — 36415 COLL VENOUS BLD VENIPUNCTURE: CPT | Mod: ZL | Performed by: NURSE PRACTITIONER

## 2022-03-29 PROCEDURE — 85004 AUTOMATED DIFF WBC COUNT: CPT | Mod: ZL | Performed by: NURSE PRACTITIONER

## 2022-03-29 PROCEDURE — 73562 X-RAY EXAM OF KNEE 3: CPT | Mod: RT

## 2022-03-29 ASSESSMENT — PAIN SCALES - GENERAL: PAINLEVEL: NO PAIN (0)

## 2022-03-29 NOTE — PROGRESS NOTES
Assessment & Plan   Problem List Items Addressed This Visit        Circulatory    Essential hypertension      Other Visit Diagnoses     Pain and swelling of right knee    -  Primary    Relevant Orders    CRP inflammation    CBC and Differential (Completed)    XR Knee Right 3 Views    Lipid screening        Relevant Orders    Lipid Panel    Encounter for screening mammogram for breast cancer        Relevant Orders    MA Screen Bilateral w/Benny      Will continue with current pressure medications as her blood pressure is being managed well with this.  Mammogram ordered, lipid panel ordered due to needing labs today anyway for right knee swelling and pain.  CMP, CBC x-ray obtained.  White count is stable, x-ray is stable, CRP is elevated.  Treated with prednisone taper.  Follow-up with sports medicine if symptoms are not resolving.  She will schedule routine physical at her convenience to complete the remainder of her preventative care issues.      No follow-ups on file.    MAURA Merida [facility] \[facility] [place] [facility]\""    Payton Obrien is a 56 year old who presents for the following health issues     History of Present Illness       Reason for visit:  Prescription  Symptom onset:  More than a month  Symptom intensity:  Mild  Symptom progression:  Improving  Had these symptoms before:  Yes  Has tried/received treatment for these symptoms:  Yes  Previous treatment was successful:  Yes  Prior treatment description:  Good  What makes it worse:  No  What makes it better:  ..?..    She eats 0-1 servings of fruits and vegetables daily.She consumes 0 sweetened beverage(s) daily.She exercises with enough effort to increase her heart rate 60 or more minutes per day.  She exercises with enough effort to increase her heart rate 5 days per week.   She is taking medications regularly.       Medication Followup of blood pressure    Taking Medication as prescribed: yes    Side Effects:  None    Medication Helping  "Symptoms:  yes     She reports blood pressure is under good control.   She is having right knee concerns. Over the past month has had knee swelling and occasional pain. Takes tylenol as needed. No known injuries.     Review of Systems   See above      Objective    /74   Pulse 98   Temp 97.5  F (36.4  C)   Wt 67 kg (147 lb 12.8 oz)   .2 cm (64.25\")   SpO2 99%   BMI 25.37 kg/m    Body mass index is 25.37 kg/m .  Physical Exam   GENERAL: healthy, alert and no distress  RESP: lungs clear to auscultation - no rales, rhonchi or wheezes  CV: regular rate and rhythm, normal S1 S2, no S3 or S4  MS: right knee warm, crepitus noted. Tender over prepatellar region with palpation  SKIN: no suspicious lesions or rashes  NEURO: Normal strength and tone, mentation intact and speech normal  PSYCH: mentation appears normal, affect normal/bright    Results for orders placed or performed in visit on 03/29/22   CBC with platelets and differential     Status: Abnormal   Result Value Ref Range    WBC Count 11.1 (H) 4.0 - 11.0 10e3/uL    RBC Count 4.63 3.80 - 5.20 10e6/uL    Hemoglobin 13.0 11.7 - 15.7 g/dL    Hematocrit 39.7 35.0 - 47.0 %    MCV 86 78 - 100 fL    MCH 28.1 26.5 - 33.0 pg    MCHC 32.7 31.5 - 36.5 g/dL    RDW 15.9 (H) 10.0 - 15.0 %    Platelet Count 406 150 - 450 10e3/uL    % Neutrophils 71 %    % Lymphocytes 20 %    % Monocytes 7 %    % Eosinophils 1 %    % Basophils 1 %    % Immature Granulocytes 0 %    NRBCs per 100 WBC 0 <1 /100    Absolute Neutrophils 7.9 1.6 - 8.3 10e3/uL    Absolute Lymphocytes 2.2 0.8 - 5.3 10e3/uL    Absolute Monocytes 0.8 0.0 - 1.3 10e3/uL    Absolute Eosinophils 0.1 0.0 - 0.7 10e3/uL    Absolute Basophils 0.1 0.0 - 0.2 10e3/uL    Absolute Immature Granulocytes 0.1 <=0.4 10e3/uL    Absolute NRBCs 0.0 10e3/uL   CBC and Differential     Status: Abnormal    Narrative    The following orders were created for panel order CBC and Differential.  Procedure                               " Abnormality         Status                     ---------                               -----------         ------                     CBC with platelets and d...[[id number]]  Abnormal            Final result                 Please view results for these tests on the individual orders.

## 2022-03-29 NOTE — NURSING NOTE
"Chief Complaint   Patient presents with     Recheck Medication     needs refills       Initial /64   Pulse 98   Temp 97.5  F (36.4  C)   Wt 67 kg (147 lb 12.8 oz)   .2 cm (64.25\")   SpO2 99%   BMI 25.37 kg/m   Estimated body mass index is 25.37 kg/m  as calculated from the following:    Height as of 3/23/21: 1.626 m (5' 4\").    Weight as of this encounter: 67 kg (147 lb 12.8 oz).  Medication Reconciliation: complete.  FOOD SECURITY SCREENING QUESTIONS  Hunger Vital Signs:  Within the past 12 months we worried whether our food would run out before we got money to buy more. Never  Within the past 12 months the food we bought just didn't last and we didn't have money to get more. Never  Florencia Yan LPN 3/29/2022 2:06 PM      Florencia Yan LPN  "

## 2022-03-30 RX ORDER — PREDNISONE 20 MG/1
TABLET ORAL
Qty: 20 TABLET | Refills: 0 | Status: SHIPPED | OUTPATIENT
Start: 2022-03-30 | End: 2022-09-15

## 2022-03-31 ENCOUNTER — TELEPHONE (OUTPATIENT)
Dept: FAMILY MEDICINE | Facility: OTHER | Age: 57
End: 2022-03-31
Payer: COMMERCIAL

## 2022-03-31 NOTE — TELEPHONE ENCOUNTER
Called patient, patient was already given results prior today and had no questions or concerns.   ...Logan Ayala LPN on 3/31/2022 at 4:00 PM

## 2022-03-31 NOTE — TELEPHONE ENCOUNTER
DMO-Reason for call: Request for results.    Name of test or procedure: Xrays and Lab     Date of test or procedure: 03.29.22    Location of test or procedure: [facility]    Preferred method for responding to this message: Telephone Call    Phone number patient can be reached at: Home number on file [phone] (home)    If we can't reach you directly, may we leave a detailed response at the number you provided?Yes

## 2022-05-02 DIAGNOSIS — I10 BENIGN ESSENTIAL HYPERTENSION: ICD-10-CM

## 2022-05-02 NOTE — TELEPHONE ENCOUNTER
[facility] sent Rx request for the following:      Lisinopril-hydroCHLOROthiazide 20-12.5 MG Oral Tablet      Last Prescription Date:   3/23/2021  Last Fill Qty/Refills:         90, R-3    Last Office Visit:              3/29/2022   Future Office visit:           none    Andrew Winter RN, BSN  ....................  5/2/2022   2:06 PM           None

## 2022-05-03 RX ORDER — LISINOPRIL AND HYDROCHLOROTHIAZIDE 12.5; 2 MG/1; MG/1
TABLET ORAL
Qty: 90 TABLET | Refills: 0 | Status: SHIPPED | OUTPATIENT
Start: 2022-05-03 | End: 2022-08-09

## 2022-08-08 ENCOUNTER — TELEPHONE (OUTPATIENT)
Dept: FAMILY MEDICINE | Facility: OTHER | Age: 57
End: 2022-08-08

## 2022-08-08 DIAGNOSIS — I10 BENIGN ESSENTIAL HYPERTENSION: ICD-10-CM

## 2022-08-08 NOTE — TELEPHONE ENCOUNTER
Reason for call: Medication or medication refill    Name of medication requested: Lisinopril    Are you out of the medication? One left    What pharmacy do you use? [facility]    Preferred method for responding to this message: Telephone Call    Phone number patient can be reached at: Cell number on file:    Telephone Information:   Mobile [phone]       If we cannot reach you directly, may we leave a detailed response at the number you provided? Yes     Sofía Ferro on 8/8/2022 at 8:53 AM

## 2022-08-08 NOTE — TELEPHONE ENCOUNTER
Routing refill request to provider for review/approval because:    LOV: 3/29/22  Called and left message for patient to return call .  Leigh Gibbons out of office until tomorrow if refill ok to refill Lisinopril-hydrochlorothiazide .  Patient is due for annual review.      Maribell Vitla RN on 8/8/2022 at 10:11 AM

## 2022-08-09 RX ORDER — LISINOPRIL AND HYDROCHLOROTHIAZIDE 12.5; 2 MG/1; MG/1
1 TABLET ORAL DAILY
Qty: 90 TABLET | Refills: 0 | Status: SHIPPED | OUTPATIENT
Start: 2022-08-09 | End: 2022-10-18

## 2022-08-09 NOTE — TELEPHONE ENCOUNTER
Patient returned call. Informed patient that Lisinopril-hydrochlorothiazide refilled today by Leigh Gibbons.    Maribell Vital RN on 8/9/2022 at 11:39 AM

## 2022-09-15 ENCOUNTER — OFFICE VISIT (OUTPATIENT)
Dept: FAMILY MEDICINE | Facility: OTHER | Age: 57
End: 2022-09-15
Attending: NURSE PRACTITIONER
Payer: COMMERCIAL

## 2022-09-15 VITALS
HEART RATE: 84 BPM | OXYGEN SATURATION: 99 % | TEMPERATURE: 97.5 F | RESPIRATION RATE: 16 BRPM | SYSTOLIC BLOOD PRESSURE: 174 MMHG | DIASTOLIC BLOOD PRESSURE: 116 MMHG

## 2022-09-15 DIAGNOSIS — M25.461 PAIN AND SWELLING OF RIGHT KNEE: ICD-10-CM

## 2022-09-15 DIAGNOSIS — I10 BENIGN ESSENTIAL HYPERTENSION: ICD-10-CM

## 2022-09-15 DIAGNOSIS — M25.561 PAIN AND SWELLING OF RIGHT KNEE: ICD-10-CM

## 2022-09-15 DIAGNOSIS — M13.0 POLYARTHRITIS: Primary | ICD-10-CM

## 2022-09-15 LAB
ALBUMIN SERPL-MCNC: 4 G/DL (ref 3.5–5.7)
ALP SERPL-CCNC: 78 U/L (ref 34–104)
ALT SERPL W P-5'-P-CCNC: 14 U/L (ref 7–52)
ANION GAP SERPL CALCULATED.3IONS-SCNC: 9 MMOL/L (ref 3–14)
AST SERPL W P-5'-P-CCNC: 16 U/L (ref 13–39)
BILIRUB SERPL-MCNC: 0.3 MG/DL (ref 0.3–1)
BUN SERPL-MCNC: 10 MG/DL (ref 7–25)
CALCIUM SERPL-MCNC: 9.7 MG/DL (ref 8.6–10.3)
CHLORIDE BLD-SCNC: 99 MMOL/L (ref 98–107)
CO2 SERPL-SCNC: 28 MMOL/L (ref 21–31)
CREAT SERPL-MCNC: 0.67 MG/DL (ref 0.6–1.2)
CRP SERPL-MCNC: 70.3 MG/L
ERYTHROCYTE [SEDIMENTATION RATE] IN BLOOD BY WESTERGREN METHOD: 73 MM/HR (ref 0–30)
GFR SERPL CREATININE-BSD FRML MDRD: >90 ML/MIN/1.73M2
GLUCOSE BLD-MCNC: 94 MG/DL (ref 70–105)
POTASSIUM BLD-SCNC: 4 MMOL/L (ref 3.5–5.1)
PROT SERPL-MCNC: 7.3 G/DL (ref 6.4–8.9)
RHEUMATOID FACT SER NEPH-ACNC: <14 IU/ML
SODIUM SERPL-SCNC: 136 MMOL/L (ref 134–144)
TSH SERPL DL<=0.005 MIU/L-ACNC: 3.88 MU/L (ref 0.4–4)
URATE SERPL-MCNC: 5 MG/DL (ref 4.4–7.6)

## 2022-09-15 PROCEDURE — 86038 ANTINUCLEAR ANTIBODIES: CPT | Mod: ZL | Performed by: FAMILY MEDICINE

## 2022-09-15 PROCEDURE — 36415 COLL VENOUS BLD VENIPUNCTURE: CPT | Mod: ZL | Performed by: FAMILY MEDICINE

## 2022-09-15 PROCEDURE — 86747 PARVOVIRUS ANTIBODY: CPT | Mod: ZL | Performed by: FAMILY MEDICINE

## 2022-09-15 PROCEDURE — 86140 C-REACTIVE PROTEIN: CPT | Mod: ZL | Performed by: FAMILY MEDICINE

## 2022-09-15 PROCEDURE — 85652 RBC SED RATE AUTOMATED: CPT | Mod: ZL | Performed by: FAMILY MEDICINE

## 2022-09-15 PROCEDURE — 84443 ASSAY THYROID STIM HORMONE: CPT | Mod: ZL | Performed by: FAMILY MEDICINE

## 2022-09-15 PROCEDURE — 86618 LYME DISEASE ANTIBODY: CPT | Mod: ZL | Performed by: FAMILY MEDICINE

## 2022-09-15 PROCEDURE — 86431 RHEUMATOID FACTOR QUANT: CPT | Mod: ZL | Performed by: FAMILY MEDICINE

## 2022-09-15 PROCEDURE — 84550 ASSAY OF BLOOD/URIC ACID: CPT | Mod: ZL | Performed by: FAMILY MEDICINE

## 2022-09-15 PROCEDURE — 99214 OFFICE O/P EST MOD 30 MIN: CPT | Performed by: FAMILY MEDICINE

## 2022-09-15 PROCEDURE — 86617 LYME DISEASE ANTIBODY: CPT | Mod: ZL | Performed by: FAMILY MEDICINE

## 2022-09-15 PROCEDURE — 80053 COMPREHEN METABOLIC PANEL: CPT | Mod: ZL | Performed by: FAMILY MEDICINE

## 2022-09-15 RX ORDER — AMLODIPINE BESYLATE 5 MG/1
5 TABLET ORAL DAILY
Qty: 90 TABLET | Refills: 3 | Status: SHIPPED | OUTPATIENT
Start: 2022-09-15 | End: 2023-08-30

## 2022-09-15 RX ORDER — PREDNISONE 20 MG/1
TABLET ORAL
Qty: 15 TABLET | Refills: 0 | Status: SHIPPED | OUTPATIENT
Start: 2022-09-15 | End: 2022-09-25

## 2022-09-15 ASSESSMENT — PAIN SCALES - GENERAL: PAINLEVEL: MODERATE PAIN (4)

## 2022-09-15 NOTE — Clinical Note
FYI, she was supposed to schedule an appointment to see you before she left [facility], but did not.  Needs to follow-up on significant hypertension and likely inflammatory arthritis.

## 2022-09-15 NOTE — PATIENT INSTRUCTIONS
Continue lisinopril hydrochlorothiazide   Restart amlodipine 5 mg daily  Check blood pressure at home  Goal is under 130/80, for sure should be under 140/90    Prednisone 40 mg daily for 5 days, then 20 mg daily for 5 days to reduce swelling  Continue Tylenol for pain  Waiting on labs for diagnosis of potential auto-immune cause for the problem vs infection    Schedule an appointment with Leigh Gibbons in the next 2-3 weeks

## 2022-09-15 NOTE — NURSING NOTE
Chief Complaint   Patient presents with     Swelling     Hand and feet, pain     Patient states that over the last week her pain began in her neck, then traveled down into her hands and finally her feet.     Medication Reconciliation: ky Fontaine LPN

## 2022-09-15 NOTE — PROGRESS NOTES
Assessment & Plan       ICD-10-CM    1. Polyarthritis  M13.0 Comprehensive Metabolic Panel     Anti Nuclear Angie IgG by IFA with Reflex     Rheumatoid factor     Parvovirus B19 antibodies IgG IgM     Uric acid     Sedimentation Rate (ESR)     CRP inflammation     Lyme Disease Total Abs Bld with Reflex to Confirm CLIA     TSH Reflex GH     predniSONE (DELTASONE) 20 MG tablet     TSH Reflex GH     Lyme Disease Total Abs Bld with Reflex to Confirm CLIA     CRP inflammation     Sedimentation Rate (ESR)     Uric acid     Parvovirus B19 antibodies IgG IgM     Rheumatoid factor     Anti Nuclear Angie IgG by IFA with Reflex     Comprehensive Metabolic Panel   2. Pain and swelling of right knee  M25.561     M25.461    3. Benign essential hypertension  I10 amLODIPine (NORVASC) 5 MG tablet       Current symptoms are very consistent with an inflammatory arthritis potentially RA.   If she only had a couple weeks of symptoms parvovirus could be the cause.  Given that her knee has been swollen for months, is more likely to be an inflammatory arthritis.  She had some basic testing in March with a elevated CRP.  Ordered multiple labs including RF, MIGUEL, uric acid, ESR, CRP, Lyme, TSH.   She should continue with Tylenol  NSAIDs would certainly help her pain, but blood pressure is markedly elevated at this time.  Therefore, given prednisone 40 mg daily for 5 days, 20 mg daily for 5 days.    She should follow-up with her PCP soon to review the lab results and likely receive a referral to rheumatology.    Blood pressure rechecked and is still markedly elevated.  She believes it is higher due to pain, but ran out of amlodipine and has not been checking her blood pressure recently.  Blood pressure was 136/74 in March when she was on amlodipine, lisinopril, hydrochlorothiazide.  Refilled amlodipine 5 mg daily.  Add this to current lisinopril hydrochlorothiazide.  Track blood pressure at home.      Follow up  2 weeks      Arjun Martinez MD      [facility]      Nursing Notes:   ZhenPrakasha, LPN  9/15/2022 12:20 PM  Signed  Chief Complaint   Patient presents with     Swelling     Hand and feet, pain     Patient states that over the last week her pain began in her neck, then traveled down into her hands and finally her feet.     Medication Reconciliation: complete    Yas ADRIANO Fontaine           SUBJECTIVE:  HPI    57 year old female presents for pain in both wrists, hands, left ankle, both feet, right knee  Symptoms present for couple weeks  Worse in the morning  Tylenol helping, but she does have a lot of difficulty walking in the morning    Turns out she has had a swollen right knee for months.  She was evaluated in March 2022 for this.  Received prednisone taper which helped.  Right knee has been painful since.  She cannot say how much the prednisone helped and for how long she has been wearing a knee brace to help with the knee pain, but certainly the right knee has been bothering her for months.    Blood pressure is elevated.  She was on amlodipine, but has not been for months as her prescription ran out.        REVIEW OF SYSTEMS:    No recent fever, cough, colds, diarrhea    Current Outpatient Medications   Medication Sig Dispense Refill     amLODIPine (NORVASC) 5 MG tablet Take 1 tablet (5 mg) by mouth daily 90 tablet 3     lisinopril-hydrochlorothiazide (ZESTORETIC) 20-12.5 MG tablet Take 1 tablet by mouth daily 90 tablet 0     predniSONE (DELTASONE) 20 MG tablet Take 3 tabs by mouth daily x 3 days, then 2 tabs daily x 3 days, then 1 tab daily x 3 days, then 1/2 tab daily x 3 days. 20 tablet 0     No Known Allergies    OBJECTIVE:  BP (!) 190/112 (BP Location: Right arm, Patient Position: Sitting, Cuff Size: Adult Regular)   Pulse 84   Temp 97.5  F (36.4  C) (Tympanic)   Resp 16   SpO2 99%     EXAM:  General Appearance: Alert. No acute distress  Psychiatric: Normal affect and mentation  Musculoskeletal: Swelling  in both wrists and hands.  Right knee with obvious effusion.  No clear ankle effusions.  Right feet somewhat swollen with mild erythema.  Is difficult to determine if she has clear synovitis in her wrists, hands, or feet

## 2022-09-17 LAB — B BURGDOR IGG+IGM SER QL: >10

## 2022-09-19 LAB
ANA PAT SER IF-IMP: ABNORMAL
ANA SER QL IF: POSITIVE
ANA TITR SER IF: ABNORMAL {TITER}
B BURGDOR IGG SERPL QL IA: >45 INDEX
B BURGDOR IGG SERPL QL IA: REACTIVE
B BURGDOR IGM SERPL QL IA: 0.42 INDEX
B BURGDOR IGM SERPL QL IA: NONREACTIVE

## 2022-09-20 ENCOUNTER — TELEPHONE (OUTPATIENT)
Dept: FAMILY MEDICINE | Facility: OTHER | Age: 57
End: 2022-09-20

## 2022-09-20 NOTE — TELEPHONE ENCOUNTER
Patient was notified of provider result note from recent lab work.  Patient would like to see Arjun Martinez MD to discuss the next option if possible.       Brenda Garcia LPN 9/20/2022 1:03 PM

## 2022-09-21 LAB
B19V IGG SER IA-ACNC: 9.66 IV
B19V IGM SER IA-ACNC: 0.31 IV

## 2022-09-23 ENCOUNTER — OFFICE VISIT (OUTPATIENT)
Dept: FAMILY MEDICINE | Facility: OTHER | Age: 57
End: 2022-09-23
Attending: FAMILY MEDICINE
Payer: COMMERCIAL

## 2022-09-23 VITALS
DIASTOLIC BLOOD PRESSURE: 82 MMHG | WEIGHT: 147 LBS | BODY MASS INDEX: 25.23 KG/M2 | TEMPERATURE: 97.7 F | SYSTOLIC BLOOD PRESSURE: 132 MMHG | HEART RATE: 80 BPM | RESPIRATION RATE: 16 BRPM | OXYGEN SATURATION: 98 %

## 2022-09-23 DIAGNOSIS — A69.23: Primary | ICD-10-CM

## 2022-09-23 LAB
CRP SERPL-MCNC: 10 MG/L
ERYTHROCYTE [SEDIMENTATION RATE] IN BLOOD BY WESTERGREN METHOD: 19 MM/HR (ref 0–30)

## 2022-09-23 PROCEDURE — 36415 COLL VENOUS BLD VENIPUNCTURE: CPT | Mod: ZL | Performed by: FAMILY MEDICINE

## 2022-09-23 PROCEDURE — 85652 RBC SED RATE AUTOMATED: CPT | Mod: ZL | Performed by: FAMILY MEDICINE

## 2022-09-23 PROCEDURE — 86140 C-REACTIVE PROTEIN: CPT | Mod: ZL | Performed by: FAMILY MEDICINE

## 2022-09-23 PROCEDURE — 99214 OFFICE O/P EST MOD 30 MIN: CPT | Performed by: FAMILY MEDICINE

## 2022-09-23 RX ORDER — DOXYCYCLINE HYCLATE 100 MG
100 TABLET ORAL 2 TIMES DAILY
Qty: 60 TABLET | Refills: 0 | Status: SHIPPED | OUTPATIENT
Start: 2022-09-23 | End: 2022-10-23

## 2022-09-23 RX ORDER — MELOXICAM 15 MG/1
15 TABLET ORAL DAILY
Qty: 30 TABLET | Refills: 0 | Status: SHIPPED | OUTPATIENT
Start: 2022-09-23 | End: 2022-10-18

## 2022-09-23 ASSESSMENT — PAIN SCALES - GENERAL: PAINLEVEL: MILD PAIN (3)

## 2022-09-23 NOTE — PROGRESS NOTES
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  Multiple labs were obtained.  Lyme IgG positive, but not IgM.  MIGUEL 1-160 in a speckled pattern.  She has never been diagnosed with Lyme disease or treated.  We discussed how chronic Lyme infection can lead to multiple sequela including Lyme arthritis.  Recommend treatment with doxycycline 100 mg twice daily for 30 days.  Complete prednisone.  Then start meloxicam 15 mg daily.  Obtain updated CRP and sedimentation rate which have improved since starting prednisone.  Plan to reassess inflammatory markers at next appointment.  If she has little improvement from doxycycline and meloxicam use, then may warrant rheumatology evaluation.  This is all discussed today    Return in about 3 weeks (around 10/14/2022).    Arjun Martinez MD   [facility]     Payton Obrien is a 57 year old, presenting for the following health issues:  RECHECK ( follow up)      History of Present Illness       Reason for visit:  Follow up    She eats 0-1 servings of fruits and vegetables daily.She consumes 0 sweetened beverage(s) daily.She exercises with enough effort to increase her heart rate 60 or more minutes per day.  She exercises with enough effort to increase her heart rate 5 days per week.   She is taking medications regularly.       Pain improved with prednisone 40 mg daily for 5 days, then 20 mg daily  Hands, feets, knee all improved  She is still having a lot of pain and difficulty moving around  No history of Lyme disease      Review of Systems   As above      Objective    /82   Pulse 80   Temp 97.7  F (36.5  C) (Tympanic)   Resp 16   Wt 66.7 kg (147 lb)   SpO2 98%   BMI  25.23 kg/m    Body mass index is 25.23 kg/m .  Physical Exam   General Appearance: Alert. No acute distress  Psychiatric: Normal affect and mentation  Musculoskeletal: Hands are less swollen.  Left knee swelling.  Feet not examined

## 2022-09-23 NOTE — PATIENT INSTRUCTIONS
Finish prednisone  Start meloxicam 15 mg daily when prednisone is complete. Take with food  OK to take Tylenol with the meloxicam. No ibuprofen or naproxen or aspirin with meloxicam  Doxycycline twice daily for 30 days to treat Lyme disease

## 2022-09-23 NOTE — NURSING NOTE
"Chief Complaint   Patient presents with     RECHECK     RC follow up       Initial /82   Pulse 80   Temp 97.7  F (36.5  C) (Tympanic)   Resp 16   Wt 66.7 kg (147 lb)   SpO2 98%   BMI 25.23 kg/m   Estimated body mass index is 25.23 kg/m  as calculated from the following:    Height as of 3/23/21: 1.626 m (5' 4\").    Weight as of this encounter: 66.7 kg (147 lb).  Medication Reconciliation: complete    FOOD SECURITY SCREENING QUESTIONS  Hunger Vital Signs:  Within the past 12 months we worried whether our food would run out before we got money to buy more. Never  Within the past 12 months the food we bought just didn't last and we didn't have money to get more. Never  Zaira Amado LPN 9/23/2022 10:04 AM    Do you have a healthcare directive? No        "

## 2022-10-03 ENCOUNTER — TELEPHONE (OUTPATIENT)
Dept: FAMILY MEDICINE | Facility: OTHER | Age: 57
End: 2022-10-03

## 2022-10-05 NOTE — TELEPHONE ENCOUNTER
Pt is in pain and she would like a prescription for pain. Please call.     Sung Madera on 10/5/2022 at 10:57 AM

## 2022-10-05 NOTE — TELEPHONE ENCOUNTER
Patient reports that the pain is still the same, worse in the morning.  Once patient is able to move around some more, the pain will ease up.      Patient noticed yesterday that she was only taking the Doxy 1 time per day not 2 time per day.  Patient is still take Meloxicam 1 time per day.      Brenda Garcia LPN 10/5/2022 11:54 AM

## 2022-10-05 NOTE — TELEPHONE ENCOUNTER
It's unfortunate she has not been taking doxycycline appropriately  Increase now to twice daily    I wanted to see her next week, she did not make an appointment. The inflammatory tests improved on prednisone, so I want to see what they look like without prednisone, only taking the meloxicam    I can give her a small amount of tramadol for pain. This is an opioid. She is already on an anti-inflammatory. May need prednisone again, but I want to see her in clinic and draw labs first.     Let me know about tramadol and she needs a same day slot next week. Thanks

## 2022-10-11 NOTE — TELEPHONE ENCOUNTER
Patient notified of providers note and placed with Arjun Martinez MD on 10-, at 0900.    Brenda Garcia LPN 10/11/2022 12:28 PM

## 2022-10-18 ENCOUNTER — HOSPITAL ENCOUNTER (OUTPATIENT)
Dept: GENERAL RADIOLOGY | Facility: OTHER | Age: 57
Discharge: HOME | End: 2022-10-18
Attending: FAMILY MEDICINE
Payer: COMMERCIAL

## 2022-10-18 ENCOUNTER — OFFICE VISIT (OUTPATIENT)
Dept: FAMILY MEDICINE | Facility: OTHER | Age: 57
End: 2022-10-18
Attending: FAMILY MEDICINE
Payer: COMMERCIAL

## 2022-10-18 VITALS
HEIGHT: 64 IN | OXYGEN SATURATION: 99 % | SYSTOLIC BLOOD PRESSURE: 120 MMHG | RESPIRATION RATE: 16 BRPM | TEMPERATURE: 96.9 F | DIASTOLIC BLOOD PRESSURE: 76 MMHG | BODY MASS INDEX: 24.59 KG/M2 | WEIGHT: 144 LBS | HEART RATE: 100 BPM

## 2022-10-18 DIAGNOSIS — M13.0 POLYARTHRITIS: ICD-10-CM

## 2022-10-18 DIAGNOSIS — A69.23: ICD-10-CM

## 2022-10-18 DIAGNOSIS — I10 BENIGN ESSENTIAL HYPERTENSION: ICD-10-CM

## 2022-10-18 DIAGNOSIS — R76.8 POSITIVE ANA (ANTINUCLEAR ANTIBODY): ICD-10-CM

## 2022-10-18 DIAGNOSIS — M13.0 POLYARTHRITIS: Primary | ICD-10-CM

## 2022-10-18 LAB
ANION GAP SERPL CALCULATED.3IONS-SCNC: 8 MMOL/L (ref 3–14)
BUN SERPL-MCNC: 19 MG/DL (ref 7–25)
CALCIUM SERPL-MCNC: 10.4 MG/DL (ref 8.6–10.3)
CHLORIDE BLD-SCNC: 99 MMOL/L (ref 98–107)
CO2 SERPL-SCNC: 29 MMOL/L (ref 21–31)
CREAT SERPL-MCNC: 0.99 MG/DL (ref 0.6–1.2)
CRP SERPL-MCNC: 44.6 MG/L
ERYTHROCYTE [SEDIMENTATION RATE] IN BLOOD BY WESTERGREN METHOD: 45 MM/HR (ref 0–30)
GFR SERPL CREATININE-BSD FRML MDRD: 65 ML/MIN/1.73M2
GLUCOSE BLD-MCNC: 88 MG/DL (ref 70–105)
MAGNESIUM SERPL-MCNC: 2 MG/DL (ref 1.9–2.7)
POTASSIUM BLD-SCNC: 4 MMOL/L (ref 3.5–5.1)
SODIUM SERPL-SCNC: 136 MMOL/L (ref 134–144)

## 2022-10-18 PROCEDURE — 86235 NUCLEAR ANTIGEN ANTIBODY: CPT | Mod: ZL | Performed by: FAMILY MEDICINE

## 2022-10-18 PROCEDURE — 36415 COLL VENOUS BLD VENIPUNCTURE: CPT | Mod: ZL | Performed by: FAMILY MEDICINE

## 2022-10-18 PROCEDURE — 73620 X-RAY EXAM OF FOOT: CPT | Mod: 50,52

## 2022-10-18 PROCEDURE — 73130 X-RAY EXAM OF HAND: CPT | Mod: LT

## 2022-10-18 PROCEDURE — 85652 RBC SED RATE AUTOMATED: CPT | Mod: ZL | Performed by: FAMILY MEDICINE

## 2022-10-18 PROCEDURE — 83735 ASSAY OF MAGNESIUM: CPT | Mod: ZL | Performed by: FAMILY MEDICINE

## 2022-10-18 PROCEDURE — 86235 NUCLEAR ANTIGEN ANTIBODY: CPT | Mod: ZL,91 | Performed by: FAMILY MEDICINE

## 2022-10-18 PROCEDURE — 86200 CCP ANTIBODY: CPT | Mod: ZL | Performed by: FAMILY MEDICINE

## 2022-10-18 PROCEDURE — 86225 DNA ANTIBODY NATIVE: CPT | Mod: ZL | Performed by: FAMILY MEDICINE

## 2022-10-18 PROCEDURE — 99214 OFFICE O/P EST MOD 30 MIN: CPT | Performed by: FAMILY MEDICINE

## 2022-10-18 PROCEDURE — 86140 C-REACTIVE PROTEIN: CPT | Mod: ZL | Performed by: FAMILY MEDICINE

## 2022-10-18 PROCEDURE — 80048 BASIC METABOLIC PNL TOTAL CA: CPT | Mod: ZL | Performed by: FAMILY MEDICINE

## 2022-10-18 RX ORDER — PREDNISONE 5 MG/1
TABLET ORAL
Qty: 100 TABLET | Refills: 0 | Status: SHIPPED | OUTPATIENT
Start: 2022-10-18 | End: 2022-11-27

## 2022-10-18 RX ORDER — MELOXICAM 15 MG/1
15 TABLET ORAL DAILY
Qty: 30 TABLET | Status: CANCELLED | OUTPATIENT
Start: 2022-10-18

## 2022-10-18 RX ORDER — LISINOPRIL AND HYDROCHLOROTHIAZIDE 12.5; 2 MG/1; MG/1
1 TABLET ORAL DAILY
Qty: 90 TABLET | Refills: 0 | Status: SHIPPED | OUTPATIENT
Start: 2022-10-18 | End: 2023-02-01

## 2022-10-18 ASSESSMENT — PAIN SCALES - GENERAL: PAINLEVEL: SEVERE PAIN (7)

## 2022-10-18 ASSESSMENT — ANXIETY QUESTIONNAIRES
2. NOT BEING ABLE TO STOP OR CONTROL WORRYING: NOT AT ALL
3. WORRYING TOO MUCH ABOUT DIFFERENT THINGS: NOT AT ALL
5. BEING SO RESTLESS THAT IT IS HARD TO SIT STILL: SEVERAL DAYS
7. FEELING AFRAID AS IF SOMETHING AWFUL MIGHT HAPPEN: NOT AT ALL
8. IF YOU CHECKED OFF ANY PROBLEMS, HOW DIFFICULT HAVE THESE MADE IT FOR YOU TO DO YOUR WORK, TAKE CARE OF THINGS AT HOME, OR GET ALONG WITH OTHER PEOPLE?: SOMEWHAT DIFFICULT
1. FEELING NERVOUS, ANXIOUS, OR ON EDGE: NOT AT ALL
IF YOU CHECKED OFF ANY PROBLEMS ON THIS QUESTIONNAIRE, HOW DIFFICULT HAVE THESE PROBLEMS MADE IT FOR YOU TO DO YOUR WORK, TAKE CARE OF THINGS AT HOME, OR GET ALONG WITH OTHER PEOPLE: SOMEWHAT DIFFICULT
7. FEELING AFRAID AS IF SOMETHING AWFUL MIGHT HAPPEN: NOT AT ALL
GAD7 TOTAL SCORE: 7
6. BECOMING EASILY ANNOYED OR IRRITABLE: NEARLY EVERY DAY
GAD7 TOTAL SCORE: 7
GAD7 TOTAL SCORE: 7
4. TROUBLE RELAXING: NEARLY EVERY DAY

## 2022-10-18 ASSESSMENT — PATIENT HEALTH QUESTIONNAIRE - PHQ9
SUM OF ALL RESPONSES TO PHQ QUESTIONS 1-9: 12
10. IF YOU CHECKED OFF ANY PROBLEMS, HOW DIFFICULT HAVE THESE PROBLEMS MADE IT FOR YOU TO DO YOUR WORK, TAKE CARE OF THINGS AT HOME, OR GET ALONG WITH OTHER PEOPLE: SOMEWHAT DIFFICULT
SUM OF ALL RESPONSES TO PHQ QUESTIONS 1-9: 12

## 2022-10-18 NOTE — PATIENT INSTRUCTIONS
Take prednisone as prescribed tapering down from 20 mg to 15 to 10 to 5 mg as written every 10 days  Stop meloxicam. Avoid any ibuprofen or naproxen  Tylenol is safe to use  Finish doxycycline  Use diclofenac gel to help hand or foot pain as needed   Referral to [facility] rheumatology  Follow up in a month

## 2022-10-18 NOTE — NURSING NOTE
"Patient presents to the clinic for follow up with joint pain.    FOOD SECURITY SCREENING QUESTIONS:    The next two questions are to help us understand your food security.  If you are feeling you need any assistance in this area, we have resources available to support you today.    Hunger Vital Signs:  Within the past 12 months we worried whether our food would run out before we got money to buy more. Never  Within the past 12 months the food we bought just didn't last and we didn't have money to get more. Never    Advance Care Directive on file? no  Advance Care Directive provided to patient? Declined.    Chief Complaint   Patient presents with     Clinic Care Coordination - Follow-up       Initial /76 (BP Location: Right arm, Patient Position: Sitting, Cuff Size: Adult Large)   Pulse 100   Temp 96.9  F (36.1  C) (Tympanic)   Resp 16   Ht 1.626 m (5' 4\")   Wt 65.3 kg (144 lb)   SpO2 99%   BMI 24.72 kg/m   Estimated body mass index is 24.72 kg/m  as calculated from the following:    Height as of this encounter: 1.626 m (5' 4\").    Weight as of this encounter: 65.3 kg (144 lb).  Medication Reconciliation: complete        Brenda Garcia LPN       "

## 2022-10-18 NOTE — PROGRESS NOTES
Assessment & Plan       ICD-10-CM    1. 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 Benign essential hypertension  I10 lisinopril-hydrochlorothiazide (ZESTORETIC) 20-12.5 MG tablet     Magnesium     Basic Metabolic Panel        Seen initially September 15 for polyarthropathy with pain in both wrists, hands, left ankle, both feet, right knee.  Clear signs of synovitis in hands and feet.  She also had swelling in the right knee.  Turns out the knee swelling was present since at least March 2022.  Initial testing with elevated ESR and CRP.  Lyme IgG positive.  MIGUEL positive with a 1:160 speckled pattern    Initially given prednisone.  This improved pain.  She cannot recall if just her knee improved or if her feet and hand pain also improved.  To my recollection, she was moving much better at the appointment September 23.  Additionally CRP and ESR had improved.  Now since stopping prednisone, she is having a lot of pain in the morning in her hands and feet.  Is difficult to stand all day for work.    Treated for Lyme arthritis with doxycycline 100 mg twice daily for 30 days.  Right knee pain has improved as expected, but hands and feet have not improved.     At this point, suspect inflammatory arthropathy with positive MIGUEL.  Obtained x-rays of hands and feet.  Additional labs drawn including follow-up on CRP, ESR and then further testing with MIGUEL, anti-DNA, centromere, scleroderma antibodies.  Referral placed to [facility] rheumatology.  Next appointment is in 2 months.  I also contacted [facility] and they are booked 4 months out.    We  discussed treatment options.  She has tried meloxicam without clear benefit.  Placed back on prednisone as this helped her pain and improved function. Taper from 20 mg down by 5 mg every 10 days.    41 minutes spent on the date of the encounter doing chart review, patient visit, documentation and discussion with other provider(s)                  Depression Screening Follow Up    PHQ 10/18/2022   PHQ-9 Total Score 12   Q9: Thoughts of better off dead/self-harm past 2 weeks Not at all         Follow Up Actions Taken  Patient counseled, no additional follow up at this time.       No follow-ups on file.    Arjun Martinez MD   [facility]     Subjective   Camille is a 57 year old accompanied by her daughter, presenting for the following health issues:  Clinic Care Coordination - Follow-up      History of Present Illness       Reason for visit:  Lymes    She eats 0-1 servings of fruits and vegetables daily.She consumes 0 sweetened beverage(s) daily.She exercises with enough effort to increase her heart rate 9 or less minutes per day.  She exercises with enough effort to increase her heart rate 5 days per week.   She is taking medications regularly.    Today's PHQ-9         PHQ-9 Total Score: 12    PHQ-9 Q9 Thoughts of better off dead/self-harm past 2 weeks :   Not at all    How difficult have these problems made it for you to do your work, take care of things at home, or get along with other people: Somewhat difficult  Today's TSERING-7 Score: 7     Took doxycycline only daily for a few days, but has been taking twice daily for 2 to 3 weeks.  Right knee pain has improved.  Less swelling.  Still has substantial hand and foot pain, worst in the mornings.  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  This made her feel sedated and off balance, shows she has only taken them at night.  Cannot say if the meloxicam is helping  She is uncertain how  "much prednisone helped previously    Review of Systems   No difficulty swallowing.  No rash.      Objective    /76 (BP Location: Right arm, Patient Position: Sitting, Cuff Size: Adult Large)   Pulse 100   Temp 96.9  F (36.1  C) (Tympanic)   Resp 16   Ht 1.626 m (5' 4\")   Wt 65.3 kg (144 lb)   SpO2 99%   BMI 24.72 kg/m    Body mass index is 24.72 kg/m .  Physical Exam   General Appearance: Alert. No acute distress  Psychiatric: Normal affect and mentation  Musculoskeletal: Continued swelling in bilateral fingers and MCP joints.  Mild foot swelling.  Right knee without erythema, still has mild effusion.              Answers for HPI/ROS submitted by the patient on 10/18/2022  If you checked off any problems, how difficult have these problems made it for you to do your work, take care of things at home, or get along with other people?: Somewhat difficult  PHQ9 TOTAL SCORE: 12  TSERING 7 TOTAL SCORE: 7      "

## 2022-10-20 LAB
CCP AB SER IA-ACNC: 0.9 U/ML
DSDNA AB SER-ACNC: 11 IU/ML
ENA SM IGG SER IA-ACNC: 1 U/ML
ENA SM IGG SER IA-ACNC: NEGATIVE
ENA SS-A AB SER IA-ACNC: 1 U/ML
ENA SS-A AB SER IA-ACNC: NEGATIVE
ENA SS-B IGG SER IA-ACNC: 2.6 U/ML
ENA SS-B IGG SER IA-ACNC: NEGATIVE
U1 SNRNP IGG SER IA-ACNC: 3.4 U/ML
U1 SNRNP IGG SER IA-ACNC: NEGATIVE

## 2022-10-21 LAB
CENTROMERE B AB SER-ACNC: <0.7 U/ML
CENTROMERE B AB SER-ACNC: NEGATIVE
ENA SCL70 IGG SER IA-ACNC: 0.6 U/ML
ENA SCL70 IGG SER IA-ACNC: NEGATIVE

## 2022-11-15 ENCOUNTER — OFFICE VISIT (OUTPATIENT)
Dept: FAMILY MEDICINE | Facility: OTHER | Age: 57
End: 2022-11-15
Attending: FAMILY MEDICINE
Payer: COMMERCIAL

## 2022-11-15 VITALS
WEIGHT: 144 LBS | TEMPERATURE: 98 F | HEART RATE: 94 BPM | DIASTOLIC BLOOD PRESSURE: 88 MMHG | SYSTOLIC BLOOD PRESSURE: 152 MMHG | OXYGEN SATURATION: 98 % | RESPIRATION RATE: 20 BRPM | BODY MASS INDEX: 24.72 KG/M2

## 2022-11-15 DIAGNOSIS — M13.0 POLYARTHRITIS: Primary | ICD-10-CM

## 2022-11-15 PROCEDURE — 99214 OFFICE O/P EST MOD 30 MIN: CPT | Performed by: FAMILY MEDICINE

## 2022-11-15 ASSESSMENT — ANXIETY QUESTIONNAIRES
6. BECOMING EASILY ANNOYED OR IRRITABLE: SEVERAL DAYS
1. FEELING NERVOUS, ANXIOUS, OR ON EDGE: SEVERAL DAYS
7. FEELING AFRAID AS IF SOMETHING AWFUL MIGHT HAPPEN: NOT AT ALL
3. WORRYING TOO MUCH ABOUT DIFFERENT THINGS: NOT AT ALL
GAD7 TOTAL SCORE: 3
2. NOT BEING ABLE TO STOP OR CONTROL WORRYING: NOT AT ALL
IF YOU CHECKED OFF ANY PROBLEMS ON THIS QUESTIONNAIRE, HOW DIFFICULT HAVE THESE PROBLEMS MADE IT FOR YOU TO DO YOUR WORK, TAKE CARE OF THINGS AT HOME, OR GET ALONG WITH OTHER PEOPLE: SOMEWHAT DIFFICULT
GAD7 TOTAL SCORE: 3
5. BEING SO RESTLESS THAT IT IS HARD TO SIT STILL: NOT AT ALL

## 2022-11-15 ASSESSMENT — PAIN SCALES - GENERAL: PAINLEVEL: SEVERE PAIN (7)

## 2022-11-15 ASSESSMENT — PATIENT HEALTH QUESTIONNAIRE - PHQ9
SUM OF ALL RESPONSES TO PHQ QUESTIONS 1-9: 3
5. POOR APPETITE OR OVEREATING: SEVERAL DAYS

## 2022-11-15 NOTE — PROGRESS NOTES
Assessment & Plan       ICD-10-CM    1. Polyarthritis  M13.0         Seen initially September 15 for polyarthropathy with pain in both wrists, hands, left ankle, both feet, right knee.  Clear signs of synovitis in hands and feet.  She also had swelling in the right knee.  Turns out the knee swelling was present since at least March 2022.  Initial testing with elevated ESR and CRP.  Lyme IgG positive.  MIGULE positive with a 1:160 speckled pattern     Initially given prednisone.  This improved pain in all joints.  CRP and ESR improved.  Once prednisone was stopped her hands and feet worsened. Knee seems better since treatment with doxycycline 100 mg BID x 30 days.    Given positive MIGUEL, inflammatory arthropathy suspected.  X-rays of hands and feet were obtained on October 18 and read as normal.  CRP and ESR increased when prednisone was stopped.     She tried meloxicam without clear benefit.  Ability to work impaired. Placed back on prednisone October 18 at 20 mg daily, tapering down by 5 mg every 10 days.  She is currently on 10 mg daily due to start 1 tablet daily tomorrow.  Has noticed increased pain in her hands and feet with prednisone reduction.    Patient reports not scheduling rheumatology appointment.  I called [facility] rheumatology and they have tried to call her twice, including today.  She was sent a letter.  Able to be worked in for an appointment in 2 days on November 17.  I gave her the location, date, and time of the appointment written down on AVS.  Looked up on a map where to go.    Continue prednisone for now.  Further treatment per rheumatology.    31 minutes spent on the date of the encounter doing chart review, patient visit, documentation and discussion with other provider(s)     Arjun Martinez MD   [facility]     Payton Obrien is a 57 year old, presenting for the following health issues:  Recheck Medication      HPI     Follow up with Lyme's.  Prednisone  helps  More pain and a little more swelling in hands as prednisone dose tapers  Also elbows and shoulders are painful  Pain in feet worst in the morning, loosens up, but then will get more ankle pain through the day      Review of Systems   As above      Objective    BP (!) 152/88 (BP Location: Right arm, Patient Position: Sitting, Cuff Size: Adult Large)   Pulse 94   Temp 98  F (36.7  C) (Tympanic)   Resp 20   Wt 65.3 kg (144 lb)   SpO2 98%   BMI 24.72 kg/m    Body mass index is 24.72 kg/m .  Physical Exam   General Appearance: Alert. No acute distress  Psychiatric: Normal affect and mentation  Musculoskeletal: Hands with mild swelling and stiffness

## 2022-11-15 NOTE — NURSING NOTE
"Patient presents to the clinic for follow up with Lyme's.    FOOD SECURITY SCREENING QUESTIONS:    The next two questions are to help us understand your food security.  If you are feeling you need any assistance in this area, we have resources available to support you today.    Hunger Vital Signs:  Within the past 12 months we worried whether our food would run out before we got money to buy more. Never  Within the past 12 months the food we bought just didn't last and we didn't have money to get more. Never    Advance Care Directive on file? no  Advance Care Directive provided to patient? Declined.  Chief Complaint   Patient presents with     Recheck Medication       Initial BP (!) 152/88 (BP Location: Right arm, Patient Position: Sitting, Cuff Size: Adult Large)   Pulse 94   Temp 98  F (36.7  C) (Tympanic)   Resp 20   Wt 65.3 kg (144 lb)   SpO2 98%   BMI 24.72 kg/m   Estimated body mass index is 24.72 kg/m  as calculated from the following:    Height as of 10/18/22: 1.626 m (5' 4\").    Weight as of this encounter: 65.3 kg (144 lb).  Medication Reconciliation: complete        Brenda Garcia LPN       "

## 2022-11-15 NOTE — PATIENT INSTRUCTIONS
Continue prednisone and reduce as directed    Appointment with rheumatology on Thursday, November 17 at 10 am. Arrive by 9:45 am  [place] [address] [place] [id number]    Call [phone] with any scheduling issues

## 2022-11-21 ENCOUNTER — TELEPHONE (OUTPATIENT)
Dept: FAMILY MEDICINE | Facility: OTHER | Age: 57
End: 2022-11-21

## 2022-11-21 DIAGNOSIS — A69.23: Primary | ICD-10-CM

## 2022-11-21 RX ORDER — DOXYCYCLINE 100 MG/1
100 CAPSULE ORAL 2 TIMES DAILY
Qty: 90 CAPSULE | Refills: 0 | Status: SHIPPED | OUTPATIENT
Start: 2022-11-21 | End: 2023-01-05

## 2022-11-21 NOTE — TELEPHONE ENCOUNTER
Voicemail left on this writers phone requesting Arjun Martinez MD to call Dr. Ceron the traveling Rheumatologists to discuss patient care.      Brenda Garcia LPN 11/21/2022 10:55 AM

## 2022-11-21 NOTE — TELEPHONE ENCOUNTER
Rheumatology would like her treated again for Lyme arthritis to see if she really needs to go on medication long-term for inflammatory arthritis.  I sent in 6 weeks of doxycyline to take TWICE daily (she did not do this the whole time last time).  Should also see infectious disease, rheumatologist placed a referral and Ryan will call.

## 2022-11-30 ENCOUNTER — TELEPHONE (OUTPATIENT)
Dept: FAMILY MEDICINE | Facility: OTHER | Age: 57
End: 2022-11-30

## 2022-11-30 NOTE — TELEPHONE ENCOUNTER
Is MegaRed Extra Strength ok to take with current medications.   Patient will also check with her pharmacist.    Brenda Garcia LPN 11/30/2022 1:31 PM

## 2022-11-30 NOTE — TELEPHONE ENCOUNTER
Patient would like to discuss MegaRed Extra Strength vitamin before taking. Please call when available     Laura Bear on 11/30/2022 at 11:47 AM

## 2022-12-21 DIAGNOSIS — A69.23: ICD-10-CM

## 2022-12-22 RX ORDER — DOXYCYCLINE 100 MG/1
100 CAPSULE ORAL 2 TIMES DAILY
Qty: 90 CAPSULE | Refills: 0 | OUTPATIENT
Start: 2022-12-22 | End: 2023-02-05

## 2022-12-22 NOTE — TELEPHONE ENCOUNTER
[id number] sent Rx request for the following:   Requested Prescriptions   Pending Prescriptions Disp Refills     doxycycline hyclate (VIBRAMYCIN) 100 MG capsule [Pharmacy Med Name: DOXYCYCLINE HYCLATE 100MG CAP] 90 capsule 0     Sig: TAKE 1 CAPSULE (100 MG) BY MOUTH 2 TIMES DAILY FOR 45 DAYS     Refill request refused: limited course of treatment, completed.    BETH MARQUEZ RN ....................  12/22/2022   11:14 AM

## 2023-01-05 ENCOUNTER — TELEPHONE (OUTPATIENT)
Dept: FAMILY MEDICINE | Facility: OTHER | Age: 58
End: 2023-01-05

## 2023-01-05 NOTE — TELEPHONE ENCOUNTER
Patient is wondering if infectious disease had talked to [facility] or sent any information. She has been trying to talk to them but has not heard back. Please call.    Sofía Ferro on 1/5/2023 at 3:48 PM

## 2023-01-06 NOTE — TELEPHONE ENCOUNTER
Her tests from infectious disease and rheumatology seem to support an inflammatory arthritis. There was no Lyme in the joint fluid taken on 12/19/22. She is supposed to call rheumatology (Dr Silva) to potentially start on methotrexate based on his note.

## 2023-01-30 DIAGNOSIS — I10 BENIGN ESSENTIAL HYPERTENSION: ICD-10-CM

## 2023-02-01 RX ORDER — LISINOPRIL AND HYDROCHLOROTHIAZIDE 12.5; 2 MG/1; MG/1
1 TABLET ORAL DAILY
Qty: 90 TABLET | Refills: 0 | Status: SHIPPED | OUTPATIENT
Start: 2023-02-01 | End: 2023-04-21

## 2023-02-01 NOTE — TELEPHONE ENCOUNTER
" [id number]  sent Rx request for the following:      Requested Prescriptions   Pending Prescriptions Disp Refills     lisinopril-hydrochlorothiazide (ZESTORETIC) 20-12.5 MG tablet [Pharmacy Med Name: LISINOPRIL/HCTZ 20-12.5MG TAB] 90 tablet 0     Sig: TAKE 1 TABLET BY MOUTH DAILY       Diuretics (Including Combos) Protocol Failed - 1/30/2023  1:10 PM        Failed - Blood pressure under 140/90 in past 12 months     BP Readings from Last 3 Encounters:   11/15/22 (!) 152/88   10/18/22 120/76   09/23/22 132/82           Passed - Recent (12 mo) or future (30 days) visit within the authorizing provider's specialty     Patient has had an office visit with the authorizing provider or a provider within the authorizing providers department within the previous 12 mos or has a future within next 30 days. See \"Patient Info\" tab in inbasket, or \"Choose Columns\" in Meds & Orders section of the refill encounter.          Passed - Medication is active on med list        Passed - Patient is age 18 or older        Passed - No active pregancy on record        Passed - Normal serum creatinine on file in past 12 months     Recent Labs   Lab Test 10/18/22  0953   CR 0.99           Passed - Normal serum potassium on file in past 12 months     Recent Labs   Lab Test 10/18/22  0953   POTASSIUM 4.0                    Passed - Normal serum sodium on file in past 12 months     Recent Labs   Lab Test 10/18/22  0953                 Passed - No positive pregnancy test in past 12 months       ACE Inhibitors (Including Combos) Protocol Failed - 1/30/2023  1:10 PM        Failed - Blood pressure under 140/90 in past 12 months     BP Readings from Last 3 Encounters:   11/15/22 (!) 152/88   10/18/22 120/76   09/23/22 132/82                 Passed - Recent (12 mo) or future (30 days) visit within the authorizing provider's specialty     Patient has had an office visit with the authorizing provider or a provider within the " "authorizing providers department within the previous 12 mos or has a future within next 30 days. See \"Patient Info\" tab in inbasket, or \"Choose Columns\" in Meds & Orders section of the refill encounter.              Passed - Medication is active on med list        Passed - Patient is age 18 or older        Passed - No active pregnancy on record        Passed - Normal serum creatinine on file in past 12 months     Recent Labs   Lab Test 10/18/22  0953   CR 0.99       Ok to refill medication if creatinine is low          Passed - Normal serum potassium on file in past 12 months     Recent Labs   Lab Test 10/18/22  0953   POTASSIUM 4.0             Passed - No positive pregnancy test within past 12 months     Last Prescription Date:   10/18/22  Last Fill Qty/Refills:         90, R-0  Last Office Visit:              11/15/22   Future Office visit:           None    Gertrude Schmid RN on 2/1/2023 at 11:49 AM      "

## 2023-02-14 DIAGNOSIS — A69.23: ICD-10-CM

## 2023-02-14 RX ORDER — DOXYCYCLINE 100 MG/1
100 CAPSULE ORAL 2 TIMES DAILY
Qty: 90 CAPSULE | Refills: 0 | OUTPATIENT
Start: 2023-02-14

## 2023-02-14 NOTE — TELEPHONE ENCOUNTER
Patient enrolled in our Rx Med Sync service to improve adherence. We are requesting a refill authorization in advance to ensure an active prescription is on file.    Angeli Burch RN .............. 2/14/2023  11:33 AM

## 2023-02-27 NOTE — TELEPHONE ENCOUNTER
Patient was notified. She isn't sure why they requested a refill because she did not.    Donna Clayton LPN on 2/27/2023 at 10:21 AM  ]

## 2023-04-18 DIAGNOSIS — I10 BENIGN ESSENTIAL HYPERTENSION: ICD-10-CM

## 2023-04-20 NOTE — TELEPHONE ENCOUNTER
[facility] sent Rx request for the following:    LISINOPRIL/HCTZ 20-12.5MG TAB  Last Prescription Date:   2/1/23  Last Fill Qty/Refills:         90, R-0    Last Office Visit:              11/15/22   Future Office visit:           None   In clinical absence of patient's primary, Leigh Gibbons, patient is requesting that this message be sent to the covering provider for consideration please.  Leslye Martínez RN on 4/20/2023 at 12:42 PM

## 2023-04-21 RX ORDER — LISINOPRIL AND HYDROCHLOROTHIAZIDE 12.5; 2 MG/1; MG/1
1 TABLET ORAL DAILY
Qty: 90 TABLET | Refills: 0 | Status: SHIPPED | OUTPATIENT
Start: 2023-04-21 | End: 2023-07-19

## 2023-04-21 NOTE — TELEPHONE ENCOUNTER
Blood pressure last appointment elevated. She should follow up to have labs for monitoring of blood pressure medication and to check blood pressure. Please schedule appointment

## 2023-04-24 NOTE — TELEPHONE ENCOUNTER
Voicemail is not set up    Letter sent    Needs follow-up b/p appointment    Cristin Zhu on 4/24/2023 at 10:40 AM

## 2023-07-17 DIAGNOSIS — I10 BENIGN ESSENTIAL HYPERTENSION: ICD-10-CM

## 2023-07-19 RX ORDER — LISINOPRIL AND HYDROCHLOROTHIAZIDE 12.5; 2 MG/1; MG/1
1 TABLET ORAL DAILY
Qty: 90 TABLET | Refills: 0 | Status: SHIPPED | OUTPATIENT
Start: 2023-07-19 | End: 2023-10-23

## 2023-07-19 NOTE — TELEPHONE ENCOUNTER
Last Prescription Date: 4/21/2023  Last Qty/Refills: 90 / R-0  Last Office Visit: 11/15/2022  Future Office Visit: None    Krama Comer RN on 7/19/2023 at 12:09 PM     Requested Prescriptions   Pending Prescriptions Disp Refills     lisinopril-hydrochlorothiazide (ZESTORETIC) 20-12.5 MG tablet [Pharmacy Med Name: LISINOPRIL/HCTZ 20-12.5MG TAB] 90 tablet 0     Sig: TAKE 1 TABLET BY MOUTH DAILY       Diuretics (Including Combos) Protocol Failed - 7/19/2023 12:06 PM        Failed - Blood pressure under 140/90 in past 12 months     BP Readings from Last 3 Encounters:   11/15/22 (!) 152/88   10/18/22 120/76   09/23/22 132/82             ACE Inhibitors (Including Combos) Protocol Failed - 7/19/2023 12:06 PM        Failed - Blood pressure under 140/90 in past 12 months     BP Readings from Last 3 Encounters:   11/15/22 (!) 152/88   10/18/22 120/76   09/23/22 132/82

## 2023-07-25 ENCOUNTER — TELEPHONE (OUTPATIENT)
Dept: LAB | Facility: OTHER | Age: 58
End: 2023-07-25
Payer: COMMERCIAL

## 2023-07-25 NOTE — PROGRESS NOTES
Patient is coming into outpatient lab today at 10:50. There are no faxed orders. Please order labs if needed.     Thank you

## 2023-07-25 NOTE — TELEPHONE ENCOUNTER
Lab notified that I did not order any labs to be drawn.  Suspect these are to be done from a rheumatology.  They will look into this further. MAURA Merida CNP on 7/25/2023 at 7:21 AM

## 2023-08-30 DIAGNOSIS — I10 BENIGN ESSENTIAL HYPERTENSION: ICD-10-CM

## 2023-08-30 RX ORDER — AMLODIPINE BESYLATE 5 MG/1
5 TABLET ORAL DAILY
Qty: 270 TABLET | Refills: 0 | Status: SHIPPED | OUTPATIENT
Start: 2023-08-30 | End: 2024-03-12

## 2023-10-16 DIAGNOSIS — I10 BENIGN ESSENTIAL HYPERTENSION: ICD-10-CM

## 2023-10-23 RX ORDER — LISINOPRIL AND HYDROCHLOROTHIAZIDE 12.5; 2 MG/1; MG/1
1 TABLET ORAL DAILY
Qty: 90 TABLET | Refills: 0 | Status: SHIPPED | OUTPATIENT
Start: 2023-10-23 | End: 2024-01-18

## 2023-10-23 NOTE — TELEPHONE ENCOUNTER
[facility] sent Rx request for the following:      Requested Prescriptions   Pending Prescriptions Disp Refills    lisinopril-hydrochlorothiazide (ZESTORETIC) 20-12.5 MG tablet [Pharmacy Med Name: LISINOPRIL/HCTZ 20-12.5MG TAB] 90 tablet 0     Sig: TAKE 1 TABLET BY MOUTH DAILY       Diuretics (Including Combos) Protocol Failed - 10/16/2023  1:31 AM        Failed - Blood pressure under 140/90 in past 12 months     BP Readings from Last 3 Encounters:   11/15/22 (!) 152/88   10/18/22 120/76   09/23/22 132/82             ACE Inhibitors (Including Combos) Protocol Failed - 10/16/2023  1:31 AM        Failed - Blood pressure under 140/90 in past 12 months     BP Readings from Last 3 Encounters:   11/15/22 (!) 152/88   10/18/22 120/76   09/23/22 132/82                 Last Prescription Date:   7/19/23  Last Fill Qty/Refills:         90, R-0    Last Office Visit:              11/15/22   Future Office visit:           No future appointments    Will send to [address] scheduling to reach out to patient to schedule annual exam.     Liv Sabillon RN on 10/23/2023 at 6:42 AM

## 2023-12-11 ENCOUNTER — NURSE TRIAGE (OUTPATIENT)
Dept: FAMILY MEDICINE | Facility: OTHER | Age: 58
End: 2023-12-11
Payer: COMMERCIAL

## 2023-12-11 ENCOUNTER — HOSPITAL ENCOUNTER (EMERGENCY)
Facility: OTHER | Age: 58
Discharge: HOME | End: 2023-12-11
Attending: FAMILY MEDICINE | Admitting: FAMILY MEDICINE
Payer: COMMERCIAL

## 2023-12-11 VITALS
RESPIRATION RATE: 19 BRPM | HEART RATE: 85 BPM | DIASTOLIC BLOOD PRESSURE: 90 MMHG | OXYGEN SATURATION: 94 % | SYSTOLIC BLOOD PRESSURE: 140 MMHG | TEMPERATURE: 97.9 F | HEIGHT: 64 IN | BODY MASS INDEX: 40.8 KG/M2 | WEIGHT: 239 LBS

## 2023-12-11 DIAGNOSIS — T88.7XXA MEDICATION SIDE EFFECTS: ICD-10-CM

## 2023-12-11 LAB
HOLD SPECIMEN: NORMAL

## 2023-12-11 PROCEDURE — 99284 EMERGENCY DEPT VISIT MOD MDM: CPT | Mod: 25 | Performed by: FAMILY MEDICINE

## 2023-12-11 PROCEDURE — 99283 EMERGENCY DEPT VISIT LOW MDM: CPT | Performed by: FAMILY MEDICINE

## 2023-12-11 PROCEDURE — 96375 TX/PRO/DX INJ NEW DRUG ADDON: CPT | Performed by: FAMILY MEDICINE

## 2023-12-11 PROCEDURE — 250N000011 HC RX IP 250 OP 636: Mod: JZ | Performed by: FAMILY MEDICINE

## 2023-12-11 PROCEDURE — 96374 THER/PROPH/DIAG INJ IV PUSH: CPT | Performed by: FAMILY MEDICINE

## 2023-12-11 RX ORDER — METHOTREXATE 2.5 MG/1
2.5 TABLET ORAL 2 TIMES DAILY
COMMUNITY
Start: 2023-02-28 | End: 2024-03-12

## 2023-12-11 RX ORDER — METHYLPREDNISOLONE SODIUM SUCCINATE 125 MG/2ML
125 INJECTION, POWDER, LYOPHILIZED, FOR SOLUTION INTRAMUSCULAR; INTRAVENOUS ONCE
Status: COMPLETED | OUTPATIENT
Start: 2023-12-11 | End: 2023-12-11

## 2023-12-11 RX ORDER — DIPHENHYDRAMINE HYDROCHLORIDE 50 MG/ML
25 INJECTION INTRAMUSCULAR; INTRAVENOUS ONCE
Status: COMPLETED | OUTPATIENT
Start: 2023-12-11 | End: 2023-12-11

## 2023-12-11 RX ADMIN — DIPHENHYDRAMINE HYDROCHLORIDE 25 MG: 50 INJECTION INTRAMUSCULAR; INTRAVENOUS at 10:24

## 2023-12-11 RX ADMIN — METHYLPREDNISOLONE SODIUM SUCCINATE 125 MG: 125 INJECTION, POWDER, FOR SOLUTION INTRAMUSCULAR; INTRAVENOUS at 10:23

## 2023-12-11 ASSESSMENT — ACTIVITIES OF DAILY LIVING (ADL): ADLS_ACUITY_SCORE: 35

## 2023-12-11 NOTE — ED TRIAGE NOTES
"  Pt is here for angioedema.  She was started on Methocabamol of Friday.   She took 2 doses Friday, Saturday, and Sunday.   Today she woke up around 0630 and realized that her tongue was swollen.  She did not take anything for the allergic reaction Her jason swelling is lessening but now she now c/o of tingling in her lips.   Pt is able to swallow and does not appear to be having any SOB at this time. BP (!) 179/108   Pulse 90   Temp 97.9  F (36.6  C) (Tympanic)   Resp 20   Ht 1.626 m (5' 4\")   Wt 108.4 kg (239 lb)   SpO2 96%   BMI 41.02 kg/m   Adri Harding RN on 12/11/2023 at 10:23 AM    "

## 2023-12-11 NOTE — ED PROVIDER NOTES
History     Chief Complaint   Patient presents with    Allergic Reaction     Angioedema      HPI  Camille Vital is a 58 year old female with history of inflammatory arthritis, hypertension who presents to the ED after waking up this morning with scratchiness in her throat and a sensation of her tongue being swollen.  She initially [facility] to [facility].  She told them her lips still feel a little tingly, she is able to swallow fine does not feel short of breath and actually feels mostly better at the time of her ED presentation.  She woke up this morning with the symptoms, at bedtime last night she did not have the symptoms.  She initially told me that she just started the medication yesterday but after further discussion and review of nurses notes patient took her doses Friday Saturday and Sunday.  She did not take any doses today.    Reviewed nurses notes below  Expand All Collapse All             Pt is here for angioedema.  She was started on Methocabamol of Friday.   She took 2 doses Friday, Saturday, and Sunday.   Today she woke up around 0630 and realized that her tongue was swollen.  She did not take anything for the allergic reaction Her jason swelling is lessening but now she now c/o of tingling in her lips.   Pt is able to swallow and does not appear to be having any SOB at this time     Allergies:  No Known Allergies    Problem List:    Patient Active Problem List    Diagnosis Date Noted    Essential hypertension 03/29/2022     Priority: Medium    Esophageal reflux 03/25/2022     Priority: Medium        Past Medical History:    Past Medical History:   Diagnosis Date    History of other genital system and obstetric disorders        Past Surgical History:    Past Surgical History:   Procedure Laterality Date    LAPAROSCOPIC TUBAL LIGATION      No Comments Provided       Family History:    No family history on file.    Social History:  Marital Status:   [2]  Social History  "    Tobacco Use    Smoking status: Every Day     Packs/day: .5     Types: Cigarettes    Smokeless tobacco: Never   Vaping Use    Vaping Use: Never used   Substance Use Topics    Alcohol use: Not Currently     Alcohol/week: 2.0 - 4.0 standard drinks of alcohol     Types: 2 - 4 Cans of beer per week     Comment: pt drinks once a week    Drug use: No        Medications:    methotrexate 2.5 MG tablet  amLODIPine (NORVASC) 5 MG tablet  diclofenac (VOLTAREN) 1 % topical gel  lisinopril-hydrochlorothiazide (ZESTORETIC) 20-12.5 MG tablet          Review of Systems   Constitutional:  Negative for chills and fever.   Respiratory:  Negative for chest tightness.        Physical Exam   BP: (!) 179/108  Pulse: 90  Temp: 97.9  F (36.6  C)  Resp: 20  Height: 162.6 cm (5' 4\")  Weight: 108.4 kg (239 lb)  SpO2: 96 %      Physical Exam  Vitals and nursing note reviewed.   HENT:      Right Ear: Tympanic membrane normal.      Left Ear: Tympanic membrane normal.      Nose: Nose normal.      Mouth/Throat:      Mouth: Mucous membranes are moist.      Pharynx: No oropharyngeal exudate or posterior oropharyngeal erythema.   Cardiovascular:      Rate and Rhythm: Normal rate and regular rhythm.      Pulses: Normal pulses.   Pulmonary:      Effort: Pulmonary effort is normal. No respiratory distress.      Breath sounds: Normal breath sounds. No stridor. No wheezing or rales.   Musculoskeletal:      Cervical back: Normal range of motion. No rigidity.       Patient monitored for greater than 2 hours in the ED with serial oropharyngeal and pulmonary assessments and this is consistent with a reassuring clinical trajectory.  Symptoms had largely resolved by the time she arrived but she was given steroids and Benadryl as well in the ED.  No results found for this or any previous visit (from the past 24 hour(s)).      Medications   methylPREDNISolone sodium succinate (solu-MEDROL) injection 125 mg (125 mg Intravenous $Given 12/11/23 1023) "   diphenhydrAMINE (BENADRYL) injection 25 mg (25 mg Intravenous $Given 12/11/23 1024)       Assessments & Plan (with Medical Decision Making)     I have reviewed the nursing notes.    I have reviewed the findings, diagnosis, plan and need for follow up with the patient.    Medical Decision Making  The patient's presentation was of moderate complexity (an acute illness with systemic symptoms).    The patient's evaluation involved:  history and exam without other MDM data elements    The patient's management necessitated moderate risk (prescription drug management including medications given in the ED).    Patient monitored for several hours in the ED, almost total resolution of symptoms.  Patient tolerating oral liquids just fine.  Recommend stop Disalcid, call her rheumatologist tomorrow for consideration of alternatives.  Return to ED with worsening symptoms, patient verbalized understanding plan is agreement left ED improving condition.  Given the chronology of these medications and the fact that she has been on lisinopril a long time and she just started the Disalcid I would not recommend quitting the lisinopril at this time although we did discuss that it is well-known potential cause of angioedema.  This episode of angioedema was mild and self-limited essentially.  I think at this juncture benefit of continuing lisinopril exceeds risk.  After shared decision-making conversation patient is in agreement with that, she agrees that no further urgent interventions or diagnostics are clearly indicated at this time.  She will return if worsening, otherwise follow-up with primary.    Discharge Medication List as of 12/11/2023 12:36 PM          Final diagnoses:   Medication side effects       12/11/2023   [facility]       Ronnie Conte MD  12/13/23 [id number]

## 2023-12-11 NOTE — TELEPHONE ENCOUNTER
Angioedema/swelling is a possible reaction. I would still recommend she be evaluated, she should also reach out to her rheumatology clinic that is managing the medication to give them a heads up as well.     Leslye Billy PA-C  12/11/2023  8:27 AM

## 2023-12-11 NOTE — TELEPHONE ENCOUNTER
S-(situation): patient calling and states that half her tongue is swollen    B-(background): patient states that she has been following with rheumatologist for lymes and swollen fingers and was started on Sulfasalazine 500 mg twice daily two days ago.    A-(assessment): patient states she woke up this morning with he left side of tongue swollen, more towards the back.  Denies any trouble breathing, redness, trouble eating or drinking.  Patient is wondering if its a reaction to new medication or should she be seen.    R-(recommendations): informed patient Leigh Gibbons out of office but will route to covering provider for review and consideration  Maribell Vital RN on 12/11/2023 at 8:12 AM

## 2023-12-11 NOTE — TELEPHONE ENCOUNTER
Called and informed patient of Leslye Billy response and patient verbalized understanding.  No available appointments in clinic , patient will present to [facility]  Maribell Vital RN on 12/11/2023 at 8:51 AM

## 2023-12-11 NOTE — ED TRIAGE NOTES
Triage Assessment (Adult)       Row Name 12/11/23 1023          Triage Assessment    Airway WDL X  Angioedema        Respiratory WDL    Respiratory WDL WDL        Skin Circulation/Temperature WDL    Skin Circulation/Temperature WDL WDL        Cardiac WDL    Cardiac WDL X  HTN        Peripheral/Neurovascular WDL    Peripheral Neurovascular WDL WDL        Cognitive/Neuro/Behavioral WDL    Cognitive/Neuro/Behavioral WDL WDL        Satish Coma Scale    Best Eye Response 4-->(E4) spontaneous     Best Motor Response 6-->(M6) obeys commands     Best Verbal Response 5-->(V5) oriented     [place] Coma Scale Score 15

## 2023-12-13 ASSESSMENT — ENCOUNTER SYMPTOMS
CHILLS: 0
FEVER: 0
CHEST TIGHTNESS: 0

## 2024-01-16 DIAGNOSIS — I10 BENIGN ESSENTIAL HYPERTENSION: ICD-10-CM

## 2024-01-17 NOTE — TELEPHONE ENCOUNTER
Nevaeh Youssef sent Rx request for the following:      Requested Prescriptions   Pending Prescriptions Disp Refills    lisinopril-hydrochlorothiazide (ZESTORETIC) 20-12.5 MG tablet [Pharmacy Med Name: LISINOPRIL/HCTZ 20-12.5MG TAB] 90 tablet 0     Sig: TAKE 1 TABLET BY MOUTH DAILY       Diuretics (Including Combos) Protocol Failed - 1/17/2024  3:12 PM        Failed - Blood pressure under 140/90 in past 12 months     BP Readings from Last 3 Encounters:   12/11/23 (!) 140/90   12/11/23 (!) 172/72   11/15/22 (!) 152/88                 Failed - Recent (12 mo) or future (30 days) visit within the authorizing provider's specialty     The patient must have completed an in-person or virtual visit within the past 12 months or has a future visit scheduled within the next 90 days with the authorizing provider s specialty.  Urgent care and e-visits do not quality as an office visit for this protocol.          Failed - Has GFR on file in past 12 months and most recent value is normal        Passed - Medication is active on med list        Passed - Medication indicated for associated diagnosis     Medication is associated with one or more of the following diagnoses:     Edema   Hypertension   Heart Failure   Meniere's Disease          Passed - Patient is age 18 or older        Passed - No active pregancy on record        Passed - No positive pregnancy test in past 12 months       ACE Inhibitors (Including Combos) Protocol Failed - 1/17/2024  3:12 PM        Failed - Blood pressure under 140/90 in past 12 months     BP Readings from Last 3 Encounters:   12/11/23 (!) 140/90   12/11/23 (!) 172/72   11/15/22 (!) 152/88                 Failed - Recent (12 mo) or future (30 days) visit within the authorizing provider's specialty     The patient must have completed an in-person or virtual visit within the past 12 months or has a future visit scheduled within the next 90 days with the authorizing provider s specialty.  Urgent care and  e-visits do not quality as an office visit for this protocol.          Failed - Has GFR on file in past 12 months and most recent value is normal        Failed - Normal sudeep creatinine on file in past 12 months     Recent Labs   Lab Test 10/18/22  0953   CR 0.99       Ok to refill medication if creatinine is low          Failed - Normal serum potassium on file in past 12 months     Recent Labs   Lab Test 10/18/22  0953   POTASSIUM 4.0             Passed - Medication is active on med list        Passed - Medication indicated for associated diagnosis     Medication is associated with one or more of the following diagnoses:     Chronic Kidney Disease (CKD)   Coronary Artery Disease (CAD)   Diabetes   Heart Failure (HF)   Hypertension (HTN)   Nephropathy            Passed - Patient is age 18 or older        Passed - No active pregnancy on record        Passed - No positive pregnancy test within past 12 months         Last Prescription Date:   10/23/23  Last Fill Qty/Refills:         90, R-0      Last Office Visit:              11/15/22 ([facility])   Future Office visit:           none    Routing refill request to provider for review/approval.     Scheduling, Please assist patient ins scheduling a yearly preventative care visit.     Kelly Salazar RN on 1/17/2024 at 3:19 PM

## 2024-01-18 RX ORDER — LISINOPRIL AND HYDROCHLOROTHIAZIDE 12.5; 2 MG/1; MG/1
1 TABLET ORAL DAILY
Qty: 30 TABLET | Refills: 0 | Status: SHIPPED | OUTPATIENT
Start: 2024-01-18 | End: 2024-02-22

## 2024-02-18 DIAGNOSIS — I10 BENIGN ESSENTIAL HYPERTENSION: ICD-10-CM

## 2024-02-20 NOTE — TELEPHONE ENCOUNTER
Nevaeh Youssef/TREY sent Rx request for the following:      Requested Prescriptions   Pending Prescriptions Disp Refills    lisinopril-hydrochlorothiazide (ZESTORETIC) 20-12.5 MG tablet [Pharmacy Med Name: LISINOPRIL/HCTZ 20-12.5MG TAB] 30 tablet 0     Sig: TAKE 1 TABLET BY MOUTH DAILY       Diuretics (Including Combos) Protocol Failed - 2/18/2024  5:01 AM        Failed - Blood pressure under 140/90 in past 12 months     BP Readings from Last 3 Encounters:   12/11/23 (!) 140/90   12/11/23 (!) 172/72   11/15/22 (!) 152/88                 Failed - Has GFR on file in past 12 months and most recent value is normal        Failed - Recent (12 mo) or future (90 days) visit within the authorizing provider's specialty     The patient must have completed an in-person or virtual visit within the past 12 months or has a future visit scheduled within the next 90 days with the authorizing provider s specialty.  Urgent care and e-visits do not quality as an office visit for this protocol.      ACE Inhibitors (Including Combos) Protocol Failed - 2/18/2024  5:01 AM        Failed - Blood pressure under 140/90 in past 12 months     BP Readings from Last 3 Encounters:   12/11/23 (!) 140/90   12/11/23 (!) 172/72   11/15/22 (!) 152/88                 Failed - Has GFR on file in past 12 months and most recent value is normal        Failed - Recent (12 mo) or future (90 days) visit within the authorizing provider's specialty     The patient must have completed an in-person or virtual visit within the past 12 months or has a future visit scheduled within the next 90 days with the authorizing provider s specialty.  Urgent care and e-visits do not quality as an office visit for this protocol.          Failed - Normal serum creatinine on file in past 12 months     Recent Labs   Lab Test 10/18/22  0953   CR 0.99       Ok to refill medication if creatinine is low          Failed - Normal serum potassium on file in past 12 months     Recent Labs    Lab Test 10/18/22  0953   POTASSIUM 4.0            Last Prescription Date:   1/18/24  Last Fill Qty/Refills:         30 tab, R-0    Last Office Visit:              11/15/22 Michelle   Future Office visit:           None currently scheduled.       Pt not seen since 11/15/22 with Dr. Martinez. Call placed to pt to get scheduled. Message left for pt to return call to clinic.     Elisa Frank RN on 2/20/2024 at 2:50 PM

## 2024-02-22 RX ORDER — LISINOPRIL AND HYDROCHLOROTHIAZIDE 12.5; 2 MG/1; MG/1
1 TABLET ORAL DAILY
Qty: 30 TABLET | Refills: 0 | Status: SHIPPED | OUTPATIENT
Start: 2024-02-22 | End: 2024-03-12

## 2024-02-22 NOTE — TELEPHONE ENCOUNTER
3rd failed attempt to reach Pt.     Pt due for annual exam. Routing to provider for refill consideration. Routing to Unit scheduling pool, to assist Pt in scheduling appointment.     Unable to complete prescription refill per RN Medication Refill Policy.     Angeli Burch RN .............. 2/22/2024  10:18 AM

## 2024-02-23 NOTE — TELEPHONE ENCOUNTER
Patient returned call and she verified her last name and . She was informed of prescription and provider response.       lisinopril-hydrochlorothiazide (ZESTORETIC) 20-12.5 MG tablet 30 tablet 0 2024 -- No   Sig - Route: TAKE 1 TABLET BY MOUTH DAILY - Oral   Sent to pharmacy as: Lisinopril-hydroCHLOROthiazide 20-12.5 MG Oral Tablet (ZESTORETIC)   Class: E-Prescribe   Notes to Pharmacy: Please tell patient to call clinic to schedule office visit. MAURA Merida CNP on 2024 at 10:23 AM     Pt transferred to scheduling line, to set up appointment. Angeli Burch RN .............. 2024  8:46 AM

## 2024-02-26 DIAGNOSIS — I10 BENIGN ESSENTIAL HYPERTENSION: ICD-10-CM

## 2024-03-01 RX ORDER — LISINOPRIL AND HYDROCHLOROTHIAZIDE 12.5; 2 MG/1; MG/1
TABLET ORAL
Qty: 30 TABLET | Refills: 0 | OUTPATIENT
Start: 2024-03-01

## 2024-03-01 NOTE — TELEPHONE ENCOUNTER
Last Office Visit:              11/15/22 [facility]   Future Office visit:           3/12/24 Edwige    Requested Prescriptions   Pending Prescriptions Disp Refills    lisinopril-hydrochlorothiazide (ZESTORETIC) 20-12.5 MG tablet [Pharmacy Med Name: LISINOPRIL/HCTZ 20-12.5MG TAB] 30 tablet 0     Sig: TAKE 1 TABLET BY MOUTH DAILY. PLEASE CALL CLINIC TO MAKE APPOINTMENT       Diuretics (Including Combos) Protocol Failed - 2/26/2024  1:39 AM        Failed - Blood pressure under 140/90 in past 12 months     BP Readings from Last 3 Encounters:   12/11/23 (!) 140/90   12/11/23 (!) 172/72   11/15/22 (!) 152/88                 Failed - Has GFR on file in past 12 months and most recent value is normal        Failed - Recent (12 mo) or future (90 days) visit within the authorizing provider's specialty     The patient must have completed an in-person or virtual visit within the past 12 months or has a future visit scheduled within the next 90 days with the authorizing provider s specialty.  Urgent care and e-visits do not quality as an office visit for this protocol.         ACE Inhibitors (Including Combos) Protocol Failed - 2/26/2024  1:39 AM        Failed - Blood pressure under 140/90 in past 12 months     BP Readings from Last 3 Encounters:   12/11/23 (!) 140/90   12/11/23 (!) 172/72   11/15/22 (!) 152/88                 Failed - Has GFR on file in past 12 months and most recent value is normal        Failed - Recent (12 mo) or future (90 days) visit within the authorizing provider's specialty     The patient must have completed an in-person or virtual visit within the past 12 months or has a future visit scheduled within the next 90 days with the authorizing provider s specialty.  Urgent care and e-visits do not quality as an office visit for this protocol.          Failed - Normal serum creatinine on file in past 12 months     Recent Labs   Lab Test 10/18/22  0953   CR 0.99       Ok to refill medication if creatinine is  low          Failed - Normal serum potassium on file in past 12 months     Recent Labs   Lab Test 10/18/22  0953   POTASSIUM 4.0          Last Prescription Date:   2/22/24  Last Fill Qty/Refills:         30 / 0

## 2024-03-12 ENCOUNTER — OFFICE VISIT (OUTPATIENT)
Dept: FAMILY MEDICINE | Facility: OTHER | Age: 59
End: 2024-03-12
Attending: NURSE PRACTITIONER
Payer: COMMERCIAL

## 2024-03-12 VITALS
WEIGHT: 145 LBS | TEMPERATURE: 97.5 F | OXYGEN SATURATION: 96 % | HEIGHT: 64 IN | SYSTOLIC BLOOD PRESSURE: 148 MMHG | RESPIRATION RATE: 20 BRPM | DIASTOLIC BLOOD PRESSURE: 98 MMHG | HEART RATE: 88 BPM | BODY MASS INDEX: 24.75 KG/M2

## 2024-03-12 DIAGNOSIS — Z87.891 PERSONAL HISTORY OF TOBACCO USE: ICD-10-CM

## 2024-03-12 DIAGNOSIS — M19.90 INFLAMMATORY ARTHRITIS: ICD-10-CM

## 2024-03-12 DIAGNOSIS — M06.00 SERONEGATIVE RHEUMATOID ARTHRITIS (H): ICD-10-CM

## 2024-03-12 DIAGNOSIS — Z00.00 ROUTINE GENERAL MEDICAL EXAMINATION AT A HEALTH CARE FACILITY: Primary | ICD-10-CM

## 2024-03-12 DIAGNOSIS — Z79.899 IMMUNOCOMPROMISED STATE DUE TO DRUG THERAPY (H): ICD-10-CM

## 2024-03-12 DIAGNOSIS — Z13.220 LIPID SCREENING: ICD-10-CM

## 2024-03-12 DIAGNOSIS — I10 BENIGN ESSENTIAL HYPERTENSION: ICD-10-CM

## 2024-03-12 DIAGNOSIS — D84.821 IMMUNOCOMPROMISED STATE DUE TO DRUG THERAPY (H): ICD-10-CM

## 2024-03-12 DIAGNOSIS — Z12.31 ENCOUNTER FOR SCREENING MAMMOGRAM FOR BREAST CANCER: ICD-10-CM

## 2024-03-12 LAB
ALBUMIN SERPL BCG-MCNC: 4.3 G/DL (ref 3.5–5.2)
ALP SERPL-CCNC: 105 U/L (ref 40–150)
ALT SERPL W P-5'-P-CCNC: 10 U/L (ref 0–50)
ANION GAP SERPL CALCULATED.3IONS-SCNC: 13 MMOL/L (ref 7–15)
AST SERPL W P-5'-P-CCNC: 17 U/L (ref 0–45)
BILIRUB SERPL-MCNC: 0.4 MG/DL
BUN SERPL-MCNC: 10.6 MG/DL (ref 8–23)
CALCIUM SERPL-MCNC: 9.3 MG/DL (ref 8.8–10.2)
CHLORIDE SERPL-SCNC: 104 MMOL/L (ref 98–107)
CHOLEST SERPL-MCNC: 155 MG/DL
CREAT SERPL-MCNC: 0.84 MG/DL (ref 0.51–0.95)
DEPRECATED HCO3 PLAS-SCNC: 24 MMOL/L (ref 22–29)
EGFRCR SERPLBLD CKD-EPI 2021: 79 ML/MIN/1.73M2
FASTING STATUS PATIENT QL REPORTED: YES
GLUCOSE SERPL-MCNC: 88 MG/DL (ref 70–99)
HDLC SERPL-MCNC: 53 MG/DL
LDLC SERPL CALC-MCNC: 67 MG/DL
NONHDLC SERPL-MCNC: 102 MG/DL
POTASSIUM SERPL-SCNC: 4.1 MMOL/L (ref 3.4–5.3)
PROT SERPL-MCNC: 7.4 G/DL (ref 6.4–8.3)
SODIUM SERPL-SCNC: 141 MMOL/L (ref 135–145)
TRIGL SERPL-MCNC: 176 MG/DL

## 2024-03-12 PROCEDURE — 82465 ASSAY BLD/SERUM CHOLESTEROL: CPT | Mod: ZL | Performed by: NURSE PRACTITIONER

## 2024-03-12 PROCEDURE — 36415 COLL VENOUS BLD VENIPUNCTURE: CPT | Mod: ZL | Performed by: NURSE PRACTITIONER

## 2024-03-12 PROCEDURE — G0296 VISIT TO DETERM LDCT ELIG: HCPCS | Performed by: NURSE PRACTITIONER

## 2024-03-12 PROCEDURE — 99396 PREV VISIT EST AGE 40-64: CPT | Performed by: NURSE PRACTITIONER

## 2024-03-12 PROCEDURE — 82247 BILIRUBIN TOTAL: CPT | Mod: ZL | Performed by: NURSE PRACTITIONER

## 2024-03-12 RX ORDER — SULFASALAZINE 500 MG/1
500 TABLET ORAL 2 TIMES DAILY
COMMUNITY

## 2024-03-12 RX ORDER — LEFLUNOMIDE 20 MG/1
20 TABLET ORAL DAILY
COMMUNITY

## 2024-03-12 RX ORDER — HYDROXYCHLOROQUINE SULFATE 200 MG/1
200 TABLET, FILM COATED ORAL 2 TIMES DAILY
COMMUNITY
Start: 2024-02-01 | End: 2024-03-12

## 2024-03-12 RX ORDER — LISINOPRIL AND HYDROCHLOROTHIAZIDE 12.5; 2 MG/1; MG/1
1 TABLET ORAL DAILY
Qty: 90 TABLET | Refills: 3 | Status: SHIPPED | OUTPATIENT
Start: 2024-03-12

## 2024-03-12 RX ORDER — AMLODIPINE BESYLATE 5 MG/1
5 TABLET ORAL DAILY
Qty: 90 TABLET | Refills: 3 | Status: SHIPPED | OUTPATIENT
Start: 2024-03-12

## 2024-03-12 SDOH — HEALTH STABILITY: PHYSICAL HEALTH: ON AVERAGE, HOW MANY DAYS PER WEEK DO YOU ENGAGE IN MODERATE TO STRENUOUS EXERCISE (LIKE A BRISK WALK)?: 5 DAYS

## 2024-03-12 SDOH — HEALTH STABILITY: PHYSICAL HEALTH: ON AVERAGE, HOW MANY MINUTES DO YOU ENGAGE IN EXERCISE AT THIS LEVEL?: 150+ MIN

## 2024-03-12 ASSESSMENT — PAIN SCALES - GENERAL: PAINLEVEL: NO PAIN (0)

## 2024-03-12 ASSESSMENT — SOCIAL DETERMINANTS OF HEALTH (SDOH): HOW OFTEN DO YOU GET TOGETHER WITH FRIENDS OR RELATIVES?: MORE THAN THREE TIMES A WEEK

## 2024-03-12 NOTE — PATIENT INSTRUCTIONS
Preventive Care Advice   This is general advice given by our system to help you stay healthy. However, your care team may have specific advice just for you. Please talk to your care team about your preventive care needs.  Nutrition  Eat 5 or more servings of fruits and vegetables each day.  Try wheat bread, brown rice and whole grain pasta (instead of white bread, rice, and pasta).  Get enough calcium and vitamin D. Check the label on foods and aim for 100% of the RDA (recommended daily allowance).  Lifestyle  Exercise at least 150 minutes each week   (30 minutes a day, 5 days a week).  Do muscle strengthening activities 2 days a week. These help control your weight and prevent disease.  No smoking.  Wear sunscreen to prevent skin cancer.  Have a dental exam and cleaning every 6 months.  Yearly exams  See your health care team every year to talk about:  Any changes in your health.  Any medicines your care team has prescribed.  Preventive care, family planning, and ways to prevent chronic diseases.  Shots (vaccines)   HPV shots (up to age 26), if you've never had them before.  Hepatitis B shots (up to age 59), if you've never had them before.  COVID-19 shot: Get this shot when it's due.  Flu shot: Get a flu shot every year.  Tetanus shot: Get a tetanus shot every 10 years.  Pneumococcal, hepatitis A, and RSV shots: Ask your care team if you need these based on your risk.  Shingles shot (for age 50 and up).  General health tests  Diabetes screening:  Starting at age 35, Get screened for diabetes at least every 3 years.  If you are younger than age 35, ask your care team if you should be screened for diabetes.  Cholesterol test: At age 39, start having a cholesterol test every 5 years, or more often if advised.  Bone density scan (DEXA): At age 50, ask your care team if you should have this scan for osteoporosis (brittle bones).  Hepatitis C: Get tested at least once in your life.  STIs (sexually transmitted  infections)  Before age 24: Ask your care team if you should be screened for STIs.  After age 24: Get screened for STIs if you're at risk. You are at risk for STIs (including HIV) if:  You are sexually active with more than one person.  You don't use condoms every time.  You or a partner was diagnosed with a sexually transmitted infection.  If you are at risk for HIV, ask about PrEP medicine to prevent HIV.  Get tested for HIV at least once in your life, whether you are at risk for HIV or not.  Cancer screening tests  Cervical cancer screening: If you have a cervix, begin getting regular cervical cancer screening tests at age 21. Most people who have regular screenings with normal results can stop after age 65. Talk about this with your provider.  Breast cancer scan (mammogram): If you've ever had breasts, begin having regular mammograms starting at age 40. This is a scan to check for breast cancer.  Colon cancer screening: It is important to start screening for colon cancer at age 45.  Have a colonoscopy test every 10 years (or more often if you're at risk) Or, ask your provider about stool tests like a FIT test every year or Cologuard test every 3 years.  To learn more about your testing options, visit: [email or web address]://[email or web address]/[email or web address].  For help making a decision, visit: https://bit.ly/hy59847.  Prostate cancer screening test: If you have a prostate and are age 55 to 69, ask your provider if you would benefit from a yearly prostate cancer screening test.  Lung cancer screening: If you are a current or former smoker age 50 to 80, ask your care team if ongoing lung cancer screenings are right for you.  For informational purposes only. Not to replace the advice of your health care provider. Copyright   2023 [employer]. All rights reserved. Clinically reviewed by the [facility] Transitions Program. [employer] 382507 - REV 01/24.    Lung Cancer Screening   Frequently Asked Questions  If you are  at high-risk for lung cancer, getting screened with low-dose computed tomography (LDCT) every year can help save your life. This handout offers answers to some of the most common questions about lung cancer screening. If you have other questions, please call [phone] ([phone]).     What is it?  Lung cancer screening uses special X-ray technology to create an image of your lung tissue. The exam is quick and easy and takes less than 10 seconds. We don t give you any medicine or use any needles. You can eat before and after the exam. You don t need to change your clothes as long as the clothing on your chest doesn t contain metal. But, you do need to be able to hold your breath for at least 6 seconds during the exam.    What is the goal of lung cancer screening?  The goal of lung cancer screening is to save lives. Many times, lung cancer is not found until a person starts having physical symptoms. Lung cancer screening can help detect lung cancer in the earliest stages when it may be easier to treat.    Who should be screened for lung cancer?  We suggest lung cancer screening for anyone who is at high-risk for lung cancer. You are in the high-risk group if you:      are between the ages of 55 and 79, and    have smoked at least 1 pack of cigarettes a day for 20 or more years, and    still smoke or have quit within the past 15 years.    However, if you have a new cough or shortness of breath, you should talk to your doctor before being screened.    Why does it matter if I have symptoms?  Certain symptoms can be a sign that you have a condition in your lungs that should be checked and treated by your doctor. These symptoms include fever, chest pain, a new or changing cough, shortness of breath that you have never felt before, coughing up blood or unexplained weight loss. Having any of these symptoms can greatly affect the results of lung cancer screening.       Should all smokers get an LDCT lung cancer  screening exam?  It depends. Lung cancer screening is for a very specific group of men and women who have a history of heavy smoking over a long period of time (see  Who should be screened for lung cancer  above).  I am in the high-risk group, but have been diagnosed with cancer in the past. Is LDCT lung cancer screening right for me?  In some cases, you should not have LDCT lung screening, such as when your doctor is already following your cancer with CT scan studies. Your doctor will help you decide if LDCT lung screening is right for you.  Do I need to have a screening exam every year?  Yes. If you are in the high-risk group described earlier, you should get an LDCT lung cancer screening exam every year until you are 79, or are no longer willing or able to undergo screening and possible procedures to diagnose and treat lung cancer.  How effective is LDCT at preventing death from lung cancer?  Studies have shown that LDCT lung cancer screening can lower the risk of death from lung cancer by 20 percent in people who are at high-risk.  What are the risks?  There are some risks and limitations of LDCT lung cancer screening. We want to make sure you understand the risks and benefits, so please let us know if you have any questions. Your doctor may want to talk with you more about these risks.    Radiation exposure: As with any exam that uses radiation, there is a very small increased risk of cancer. The amount of radiation in LDCT is small--about the same amount a person would get from a mammogram. Your doctor orders the exam when he or she feels the potential benefits outweigh the risks.    False negatives: No test is perfect, including LDCT. It is possible that you may have a medical condition, including lung cancer, that is not found during your exam. This is called a false negative result.    False positives and more testing: LDCT very often finds something in the lung that could be cancer, but in fact is not.  This is called a false positive result. False positive tests often cause anxiety. To make sure these findings are not cancer, you may need to have more tests. These tests will be done only if you give us permission. Sometimes patients need a treatment that can have side effects, such as a biopsy. For more information on false positives, see  What can I expect from the results?     Findings not related to lung cancer: Your LDCT exam also takes pictures of areas of your body next to your lungs. In a very small number of cases, the CT scan will show an abnormal finding in one of these areas, such as your kidneys, adrenal glands, liver or thyroid. This finding may not be serious, but you may need more tests. Your doctor can help you decide what other tests you may need, if any.  What can I expect from the results?  About 1 out of 4 LDCT exams will find something that may need more tests. Most of the time, these findings are lung nodules. Lung nodules are very small collections of tissue in the lung. These nodules are very common, and the vast majority--more than 97 percent--are not cancer (benign). Most are normal lymph nodes or small areas of scarring from past infections.  But, if a small lung nodule is found to be cancer, the cancer can be cured more than 90 percent of the time. To know if the nodule is cancer, we may need to get more images before your next yearly screening exam. If the nodule has suspicious features (for example, it is large, has an odd shape or grows over time), we will refer you to a specialist for further testing.  Will my doctor also get the results?  Yes. Your doctor will get a copy of your results.  Is it okay to keep smoking now that there s a cancer screening exam?  No. Tobacco is one of the strongest cancer-causing agents. It causes not only lung cancer, but other cancers and cardiovascular (heart) diseases as well. The damage caused by smoking builds over time. This means that the longer  you smoke, the higher your risk of disease. While it is never too late to quit, the sooner you quit, the better.  Where can I find help to quit smoking?  The best way to prevent lung cancer is to stop smoking. If you have already quit smoking, congratulations and keep it up! For help on quitting smoking, please call [employer] at 2-278-QUITNOW (1-648.467.1397) or the [employer] at 1-685.569.7038 to find local resources near you.  One-on-one health coaching:  If you d prefer to work individually with a health care provider on tobacco cessation, we offer:      Medication Therapy Management:  Our specially trained pharmacists work closely with you and your doctor to help you quit smoking.  Call [phone] or [phone] (toll free).

## 2024-03-12 NOTE — LETTER
March 12, 2024      Camille Vital  [address] [facility] [address]        Dear Ms.Brausen Vital,    We are writing to inform you of your test results.    Your test results fall within the expected range(s) or remain unchanged from previous results.  Please continue with current treatment plan.    Resulted Orders   Comprehensive Metabolic Panel   Result Value Ref Range    Sodium 141 135 - 145 mmol/L      Comment:      Reference intervals for this test were updated on 09/26/2023 to more accurately reflect our healthy population. There may be differences in the flagging of prior results with similar values performed with this method. Interpretation of those prior results can be made in the context of the updated reference intervals.     Potassium 4.1 3.4 - 5.3 mmol/L    Carbon Dioxide (CO2) 24 22 - 29 mmol/L    Anion Gap 13 7 - 15 mmol/L    Urea Nitrogen 10.6 8.0 - 23.0 mg/dL    Creatinine 0.84 0.51 - 0.95 mg/dL    GFR Estimate 79 >60 mL/min/1.73m2    Calcium 9.3 8.8 - 10.2 mg/dL    Chloride 104 98 - 107 mmol/L    Glucose 88 70 - 99 mg/dL    Alkaline Phosphatase 105 40 - 150 U/L      Comment:      Reference intervals for this test were updated on 11/14/2023 to more accurately reflect our healthy population. There may be differences in the flagging of prior results with similar values performed with this method. Interpretation of those prior results can be made in the context of the updated reference intervals.    AST 17 0 - 45 U/L      Comment:      Reference intervals for this test were updated on 6/12/2023 to more accurately reflect our healthy population. There may be differences in the flagging of prior results with similar values performed with this method. Interpretation of those prior results can be made in the context of the updated reference intervals.    ALT 10 0 - 50 U/L      Comment:      Reference intervals for this test were updated on 6/12/2023 to more accurately reflect our  healthy population. There may be differences in the flagging of prior results with similar values performed with this method. Interpretation of those prior results can be made in the context of the updated reference intervals.      Protein Total 7.4 6.4 - 8.3 g/dL    Albumin 4.3 3.5 - 5.2 g/dL    Bilirubin Total 0.4 <=1.2 mg/dL   Lipid Panel   Result Value Ref Range    Cholesterol 155 <200 mg/dL    Triglycerides 176 (H) <150 mg/dL    Direct Measure HDL 53 >=50 mg/dL    LDL Cholesterol Calculated 67 <=100 mg/dL    Non HDL Cholesterol 102 <130 mg/dL    Patient Fasting > 8hrs? Yes     Narrative    Cholesterol  Desirable:  <200 mg/dL    Triglycerides  Normal:  Less than 150 mg/dL  Borderline High:  150-199 mg/dL  High:  200-499 mg/dL  Very High:  Greater than or equal to 500 mg/dL    Direct Measure HDL  Female:  Greater than or equal to 50 mg/dL   Male:  Greater than or equal to 40 mg/dL    LDL Cholesterol  Desirable:  <100mg/dL  Above Desirable:  100-129 mg/dL   Borderline High:  130-159 mg/dL   High:  160-189 mg/dL   Very High:  >= 190 mg/dL    Non HDL Cholesterol  Desirable:  130 mg/dL  Above Desirable:  130-159 mg/dL  Borderline High:  160-189 mg/dL  High:  190-219 mg/dL  Very High:  Greater than or equal to 220 mg/dL       If you have any questions or concerns, please call the clinic at the number listed above.       Sincerely,      MAURA Altamirano CNP

## 2024-03-12 NOTE — PROGRESS NOTES
Preventive Care Visit  [facility] AND [facility]  MAURA Merida CNP, Nurse Practitioner - Family  Mar 12, 2024    Assessment & Plan   Problem List Items Addressed This Visit          Musculoskeletal and Integumentary    Inflammatory arthritis    Relevant Medications    leflunomide (ARAVA) 20 MG tablet       Immune    Seronegative rheumatoid arthritis (H)    Relevant Medications    leflunomide (ARAVA) 20 MG tablet    sulfaSALAzine (AZULFIDINE) 500 MG tablet    Immunocompromised state due to drug therapy  (H24)     Other Visit Diagnoses       Routine general medical examination at a health care facility    -  Primary    Benign essential hypertension        Relevant Medications    amLODIPine (NORVASC) 5 MG tablet    lisinopril-hydrochlorothiazide (ZESTORETIC) 20-12.5 MG tablet    Other Relevant Orders    Comprehensive Metabolic Panel    Personal history of tobacco use        Relevant Orders    Prof fee: Shared Decision Making for Lung Cancer Screening (Completed)    CT Chest Lung Cancer Scrn Low Dose wo    Lipid screening        Relevant Orders    Lipid Panel    Encounter for screening mammogram for breast cancer        Relevant Orders    MA Screen Bilateral w/Benny        Seronegative rheumatoid arthritis, inflammatory arthritis, following with rheumatology.  Medication list reconciled and continue with rheumatology every 3 months.  Blood pressure mildly elevated today, she has been out of amlodipine for the past couple weeks.  Refilled amlodipine as well as lisinopril with hydrochlorothiazide.  CMP updated.  Lipid panel updated  Mammogram ordered  Lung cancer CT screen ordered  She is not ready to move forward with colon cancer screening or Pap smear at this time.    Patient has been advised of split billing requirements and indicates understanding: Yes  Ordering of each unique test  Prescription drug management       Nicotine/Tobacco Cessation  She reports that she has been smoking cigarettes. She has  been smoking an average of 0.3 packs per day. She has never used smokeless tobacco.  Nicotine/Tobacco Cessation Plan  Information offered: Patient not interested at this time  Working on cutting down      Counseling  Appropriate preventive services were discussed with this patient, including applicable screening as appropriate for fall prevention, nutrition, physical activity, Tobacco-use cessation, weight loss and cognition.  Checklist reviewing preventive services available has been given to the patient.  Reviewed patient's diet, addressing concerns and/or questions.   The patient was instructed to see the dentist every 6 months.         Return in about 53 weeks (around 3/18/2025) for Annual Wellness Visit.      Payton Obrien is a 58 year old, presenting for the following:  Physical         Health Care Directive  Patient does not have a Health Care Directive or Living Will:     HPI    She comes in today for annual wellness exam.  She denies any health concerns today.  She continues on lisinopril with hydrochlorothiazide and amlodipine for blood pressure management, does not monitor blood pressures at home.  She has been out of amlodipine for the past 2 weeks her blood pressure is mildly elevated in office today.  She also continues on leflunomide and sulfasalazine for rheumatoid arthritis.  She continues to have swelling and reduced range of motion to bilateral hands.        3/12/2024   General Health   How would you rate your overall physical health? Good   Feel stress (tense, anxious, or unable to sleep) Not at all         3/12/2024   Nutrition   Three or more servings of calcium each day? Yes   Diet: Regular (no restrictions)   How many servings of fruit and vegetables per day? (!) 0-1   How many sweetened beverages each day? 0-1         3/12/2024   Exercise   Days per week of moderate/strenous exercise 5 days   Average minutes spent exercising at this level 150+ min         3/12/2024   Social Factors    Frequency of gathering with friends or relatives More than three times a week   Worry food won't last until get money to buy more No   Food not last or not have enough money for food? No   Do you have housing?  Yes   Are you worried about losing your housing? No   Lack of transportation? No   Unable to get utilities (heat,electricity)? No         2/28/2018   Fall Risk   Fallen 2 or more times in the past year? No          3/12/2024   Dental   Dentist two times every year? (!) NO         3/12/2024   TB Screening   Were you born outside of [place]?  No         Today's PHQ-2 Score:       3/12/2024    10:39 AM   PHQ-2 ( 1999 [employer])   Q1: Little interest or pleasure in doing things 0   Q2: Feeling down, depressed or hopeless 0   PHQ-2 Score 0   Q1: Little interest or pleasure in doing things Not at all   Q2: Feeling down, depressed or hopeless Not at all   PHQ-2 Score 0           3/12/2024   Substance Use   If I could quit smoking, I would Somewhat agree   I want to quit somking, worry about health affects Completely agree   Willing to make a plan to quit smoking Somewhat agree   Willing to cut down before quitting Completely agree   Alcohol more than 3/day or more than 7/wk No   Do you use any other substances recreationally? No     Social History     Tobacco Use    Smoking status: Every Day     Packs/day: .25     Types: Cigarettes    Smokeless tobacco: Never   Vaping Use    Vaping Use: Never used   Substance Use Topics    Alcohol use: Not Currently     Alcohol/week: 2.0 - 4.0 standard drinks of alcohol     Types: 2 - 4 Cans of beer per week     Comment: pt drinks once a week    Drug use: No             3/12/2024   Breast Cancer Screening   Family history of breast, colon, or ovarian cancer? No / Unknown      Mammogram Screening - Mammogram every 1-2 years updated in Health Maintenance based on mutual decision making  Discussed mammograms and she is willing to have this ordered.        3/12/2024   STI Screening   New  sexual partner(s) since last STI/HIV test? No     History of abnormal Pap smear: Has not had Pap smear in many years.  She would like to move forward with mammogram first and will consider Pap smear at a later date.       ASCVD Risk   The ASCVD Risk score (Samanta LARSEN, et al., 2019) failed to calculate for the following reasons:    The valid total cholesterol range is 130 to 320 mg/dL           Reviewed and updated as needed this visit by Provider   Tobacco  Allergies  Meds  Problems  Med Hx  Surg Hx  Fam Hx            Past Medical History:   Diagnosis Date    History of other genital system and obstetric disorders      3, para 3     Past Surgical History:   Procedure Laterality Date    LAPAROSCOPIC TUBAL LIGATION      No Comments Provided     BP Readings from Last 3 Encounters:   24 (!) 148/98   23 (!) 140/90   23 (!) 172/72    Wt Readings from Last 3 Encounters:   24 65.8 kg (145 lb)   23 108.4 kg (239 lb)   23 108.5 kg (239 lb 3.2 oz)                  Patient Active Problem List   Diagnosis    Esophageal reflux    Essential hypertension    Inflammatory arthritis    Seronegative rheumatoid arthritis (H)    Immunocompromised state due to drug therapy  (H24)     Past Surgical History:   Procedure Laterality Date    LAPAROSCOPIC TUBAL LIGATION      No Comments Provided       Social History     Tobacco Use    Smoking status: Every Day     Packs/day: .25     Types: Cigarettes    Smokeless tobacco: Never   Substance Use Topics    Alcohol use: Not Currently     Alcohol/week: 2.0 - 4.0 standard drinks of alcohol     Types: 2 - 4 Cans of beer per week     Comment: pt drinks once a week     History reviewed. No pertinent family history.      Current Outpatient Medications   Medication Sig Dispense Refill    amLODIPine (NORVASC) 5 MG tablet Take 1 tablet (5 mg) by mouth daily 90 tablet 3    leflunomide (ARAVA) 20 MG tablet Take 20 mg by mouth daily       "lisinopril-hydrochlorothiazide (ZESTORETIC) 20-12.5 MG tablet Take 1 tablet by mouth daily 90 tablet 3    sulfaSALAzine (AZULFIDINE) 500 MG tablet Take 500 mg by mouth 2 times daily       No Known Allergies      Review of Systems  See abpve     Objective    Exam  BP (!) 148/98   Pulse 88   Temp 97.5  F (36.4  C)   Resp 20   Ht 1.626 m (5' 4\")   Wt 65.8 kg (145 lb)   SpO2 96%   BMI 24.89 kg/m     Estimated body mass index is 24.89 kg/m  as calculated from the following:    Height as of this encounter: 1.626 m (5' 4\").    Weight as of this encounter: 65.8 kg (145 lb).    Physical Exam  GENERAL: alert and no distress  EYES: Eyes grossly normal to inspection, PERRL and conjunctivae and sclerae normal  HENT: ear canals and TM's normal, nose and mouth without ulcers or lesions  NECK: no adenopathy, no asymmetry, masses, or scars  RESP: lungs clear to auscultation - no rales, rhonchi or wheezes  CV: regular rate and rhythm, normal S1 S2, no S3 or S4, no murmur, click or rub, no peripheral edema  ABDOMEN: soft, nontender, no hepatosplenomegaly, no masses and bowel sounds normal  MS: Bilateral hands with swollen joints, limited range of motion to fingers  SKIN: no suspicious lesions or rashes  NEURO: Normal strength and tone, mentation intact and speech normal  PSYCH: mentation appears normal, affect normal/bright      Signed Electronically by: MAURA Merida CNP  Lung Cancer Screening Shared Decision Making Visit     Camille Vital, a 58 year old female, is eligible for lung cancer screening    History   Smoking Status    Every Day    Packs/day: 0.25    Types: Cigarettes   Smokeless Tobacco    Never       I have discussed with patient the risks and benefits of screening for lung cancer with low-dose CT.     The risks include:    radiation exposure: one low dose chest CT has as much ionizing radiation as about 15 chest x-rays, or 6 months of background radiation living in [place]      false " positives: most findings/nodules are NOT cancer, but some might still require additional diagnostic evaluation, including biopsy    over-diagnosis: some slow growing cancers that might never have been clinically significant will be detected and treated unnecessarily     The benefit of early detection of lung cancer is contingent upon adherence to annual screening or more frequent follow up if indicated.     Furthermore, to benefit from screening, Camille must be willing and able to undergo diagnostic procedures, if indicated. Although no specific guide is available for determining severity of comorbidities, it is reasonable to withhold screening in patients who have greater mortality risk from other diseases.     We did discuss that the best way to prevent lung cancer is to not smoke.    Some patients may value a numeric estimation of lung cancer risk when evaluating if lung cancer screening is right for them, here is one calculator:    ShouldIScreen

## 2025-01-20 DIAGNOSIS — I10 BENIGN ESSENTIAL HYPERTENSION: ICD-10-CM

## 2025-01-21 NOTE — TELEPHONE ENCOUNTER
Nevaeh Mikael sent Rx request for the following:      Requested Prescriptions   Pending Prescriptions Disp Refills    lisinopril-hydrochlorothiazide (ZESTORETIC) 20-12.5 MG tablet [Pharmacy Med Name: LISINOPRIL/HCTZ 20-12.5MG TAB] 90 tablet 3     Sig: TAKE 1 TABLET BY MOUTH DAILY       Diuretics (Including Combos) Protocol Failed - 1/21/2025  4:05 PM        Failed - Most recent blood pressure under 140/90 in past 12 months     BP Readings from Last 3 Encounters:   03/12/24 (!) 148/98   12/11/23 (!) 140/90   12/11/23 (!) 172/72       No data recorded           ACE Inhibitors (Including Combos) Protocol Failed - 1/21/2025  4:05 PM        Failed - Most recent blood pressure under 140/90 in past 12 months- Clinicial or Patient Reported     BP Readings from Last 3 Encounters:   03/12/24 (!) 148/98   12/11/23 (!) 140/90   12/11/23 (!) 172/72       No data recorded         Last Prescription Date:   3/12/24  Last Fill Qty/Refills:         90, R-3    Last Office Visit:              3/12/24   Future Office visit:            none     Routing refill request to provider for review/approval.     Kelly Salazar RN on 1/21/2025 at 4:07 PM

## 2025-01-22 RX ORDER — LISINOPRIL AND HYDROCHLOROTHIAZIDE 12.5; 2 MG/1; MG/1
1 TABLET ORAL DAILY
Qty: 90 TABLET | Refills: 0 | Status: SHIPPED | OUTPATIENT
Start: 2025-01-22

## 2025-04-24 DIAGNOSIS — I10 BENIGN ESSENTIAL HYPERTENSION: ICD-10-CM

## 2025-04-28 RX ORDER — LISINOPRIL AND HYDROCHLOROTHIAZIDE 12.5; 2 MG/1; MG/1
1 TABLET ORAL DAILY
Qty: 90 TABLET | Refills: 0 | Status: SHIPPED | OUTPATIENT
Start: 2025-04-28

## 2025-04-28 NOTE — TELEPHONE ENCOUNTER
[id number] sent Rx request for the following:      Requested Prescriptions   Pending Prescriptions Disp Refills    lisinopril-hydrochlorothiazide (ZESTORETIC) 20-12.5 MG tablet [Pharmacy Med Name: LISINOPRIL/HCTZ 20-12.5MG TAB] 90 tablet 0     Sig: TAKE 1 TABLET BY MOUTH DAILY       Diuretics (Including Combos) Protocol Failed - 4/28/2025  7:35 AM        Failed - Most recent blood pressure under 140/90 in past 12 months     BP Readings from Last 3 Encounters:   03/12/24 (!) 148/98   12/11/23 (!) 140/90   12/11/23 (!) 172/72       No data recorded            Failed - Potassium level on file in past 12 months        Failed - Has GFR on file in past 12 months and most recent value is normal        Failed - Recent (12 mo) or future (90 days) visit within the authorizing provider's specialty     The patient must have completed an in-person or virtual visit within the past 12 months or has a future visit scheduled within the next 90 days with the authorizing provider s specialty.  Urgent care and e-visits do not qualify as an office visit for this protocol.      ACE Inhibitors (Including Combos) Protocol Failed - 4/28/2025  7:35 AM        Failed - Most recent blood pressure under 140/90 in past 12 months- Clinicial or Patient Reported     BP Readings from Last 3 Encounters:   03/12/24 (!) 148/98   12/11/23 (!) 140/90   12/11/23 (!) 172/72       No data recorded            Failed - Recent (12 mo) or future (90 days) visit within the authorizing provider's specialty     The patient must have completed an in-person or virtual visit within the past 12 months or has a future visit scheduled within the next 90 days with the authorizing provider s specialty.  Urgent care and e-visits do not qualify as an office visit for this protocol.          Failed - Most recent GFR on file in the past 12 months >30        Failed - Normal serum potassium on file in past 12 months     Recent Labs   Lab Test 03/12/24  4356    POTASSIUM 4.1          Benign essential hypertension [I10]        Last Prescription Date:   1/22/25  Last Fill Qty/Refills:         90, R-0    Last Office Visit:              3/12/24 (px)   Future Office visit:            None    Per LOV note:  Return in about 53 weeks (around 3/18/2025) for Annual Wellness Visit     Pt due for annual. Routing to provider for refill consideration. Routing to Unit scheduling pool, to assist Pt in scheduling appointment.     Unable to complete prescription refill per RN Medication Refill Policy.     Adair Lemus RN on 4/28/2025 at 7:36 AM

## (undated) RX ORDER — IBUPROFEN 200 MG
TABLET ORAL
Status: DISPENSED
Start: 2021-02-20

## (undated) RX ORDER — METHYLPREDNISOLONE SODIUM SUCCINATE 125 MG/2ML
INJECTION, POWDER, LYOPHILIZED, FOR SOLUTION INTRAMUSCULAR; INTRAVENOUS
Status: DISPENSED
Start: 2023-12-11

## (undated) RX ORDER — MORPHINE SULFATE 10 MG/ML
INJECTION, SOLUTION INTRAMUSCULAR; INTRAVENOUS
Status: DISPENSED
Start: 2018-02-28

## (undated) RX ORDER — ACETAMINOPHEN 325 MG/1
TABLET ORAL
Status: DISPENSED
Start: 2021-02-20

## (undated) RX ORDER — IBUPROFEN 400 MG/1
TABLET, FILM COATED ORAL
Status: DISPENSED
Start: 2021-02-20

## (undated) RX ORDER — DIPHENHYDRAMINE HYDROCHLORIDE 50 MG/ML
INJECTION INTRAMUSCULAR; INTRAVENOUS
Status: DISPENSED
Start: 2023-12-11